# Patient Record
Sex: FEMALE | Race: WHITE | NOT HISPANIC OR LATINO | Employment: FULL TIME | URBAN - METROPOLITAN AREA
[De-identification: names, ages, dates, MRNs, and addresses within clinical notes are randomized per-mention and may not be internally consistent; named-entity substitution may affect disease eponyms.]

---

## 2023-08-31 ENCOUNTER — LAB (OUTPATIENT)
Dept: LAB | Facility: LAB | Age: 38
End: 2023-08-31
Payer: COMMERCIAL

## 2023-08-31 ENCOUNTER — OFFICE VISIT (OUTPATIENT)
Dept: PRIMARY CARE | Facility: CLINIC | Age: 38
End: 2023-08-31
Payer: COMMERCIAL

## 2023-08-31 VITALS
DIASTOLIC BLOOD PRESSURE: 70 MMHG | WEIGHT: 133 LBS | HEIGHT: 63 IN | HEART RATE: 58 BPM | BODY MASS INDEX: 23.57 KG/M2 | TEMPERATURE: 97.4 F | OXYGEN SATURATION: 99 % | SYSTOLIC BLOOD PRESSURE: 110 MMHG

## 2023-08-31 DIAGNOSIS — Z00.00 ANNUAL PHYSICAL EXAM: Primary | ICD-10-CM

## 2023-08-31 DIAGNOSIS — Z00.00 ANNUAL PHYSICAL EXAM: ICD-10-CM

## 2023-08-31 DIAGNOSIS — E03.9 HYPOTHYROIDISM, UNSPECIFIED TYPE: ICD-10-CM

## 2023-08-31 PROBLEM — L70.0 ACNE VULGARIS: Status: ACTIVE | Noted: 2022-05-23

## 2023-08-31 PROBLEM — L30.9 DERMATITIS, UNSPECIFIED: Status: ACTIVE | Noted: 2022-05-23

## 2023-08-31 PROBLEM — D22.60 MELANOCYTIC NEVI OF UNSPECIFIED UPPER LIMB, INCLUDING SHOULDER: Status: ACTIVE | Noted: 2022-05-23

## 2023-08-31 PROBLEM — D22.4 MELANOCYTIC NEVI OF SCALP AND NECK: Status: ACTIVE | Noted: 2022-05-23

## 2023-08-31 PROBLEM — D22.5 MELANOCYTIC NEVI OF TRUNK: Status: ACTIVE | Noted: 2022-05-23

## 2023-08-31 PROBLEM — L57.9 SKIN CHANGES DUE TO CHRONIC EXPOSURE TO NONIONIZING RADIATION, UNSPECIFIED: Status: ACTIVE | Noted: 2022-05-23

## 2023-08-31 PROBLEM — D22.70 MELANOCYTIC NEVI OF UNSPECIFIED LOWER LIMB, INCLUDING HIP: Status: ACTIVE | Noted: 2022-05-23

## 2023-08-31 PROBLEM — J01.90 ACUTE SINUSITIS: Status: ACTIVE | Noted: 2023-08-31

## 2023-08-31 PROBLEM — D22.39 MELANOCYTIC NEVI OF OTHER PARTS OF FACE: Status: ACTIVE | Noted: 2022-05-23

## 2023-08-31 PROBLEM — L30.1 DYSHIDROTIC ECZEMA: Status: ACTIVE | Noted: 2023-08-31

## 2023-08-31 PROBLEM — D48.5 NEOPLASM OF UNCERTAIN BEHAVIOR OF SKIN: Status: ACTIVE | Noted: 2022-05-23

## 2023-08-31 PROBLEM — F41.9 ANXIETY: Status: ACTIVE | Noted: 2023-08-31

## 2023-08-31 LAB
ALANINE AMINOTRANSFERASE (SGPT) (U/L) IN SER/PLAS: 17 U/L (ref 7–45)
ALBUMIN (G/DL) IN SER/PLAS: 4.4 G/DL (ref 3.4–5)
ALKALINE PHOSPHATASE (U/L) IN SER/PLAS: 45 U/L (ref 33–110)
ANION GAP IN SER/PLAS: 12 MMOL/L (ref 10–20)
ASPARTATE AMINOTRANSFERASE (SGOT) (U/L) IN SER/PLAS: 19 U/L (ref 9–39)
BILIRUBIN TOTAL (MG/DL) IN SER/PLAS: 1.5 MG/DL (ref 0–1.2)
CALCIUM (MG/DL) IN SER/PLAS: 10.1 MG/DL (ref 8.6–10.6)
CARBON DIOXIDE, TOTAL (MMOL/L) IN SER/PLAS: 27 MMOL/L (ref 21–32)
CHLORIDE (MMOL/L) IN SER/PLAS: 104 MMOL/L (ref 98–107)
CHOLESTEROL (MG/DL) IN SER/PLAS: 135 MG/DL (ref 0–199)
CHOLESTEROL IN HDL (MG/DL) IN SER/PLAS: 42.3 MG/DL
CHOLESTEROL/HDL RATIO: 3.2
CREATININE (MG/DL) IN SER/PLAS: 0.63 MG/DL (ref 0.5–1.05)
ERYTHROCYTE DISTRIBUTION WIDTH (RATIO) BY AUTOMATED COUNT: 11.9 % (ref 11.5–14.5)
ERYTHROCYTE MEAN CORPUSCULAR HEMOGLOBIN CONCENTRATION (G/DL) BY AUTOMATED: 33 G/DL (ref 32–36)
ERYTHROCYTE MEAN CORPUSCULAR VOLUME (FL) BY AUTOMATED COUNT: 87 FL (ref 80–100)
ERYTHROCYTES (10*6/UL) IN BLOOD BY AUTOMATED COUNT: 4.79 X10E12/L (ref 4–5.2)
GFR FEMALE: >90 ML/MIN/1.73M2
GLUCOSE (MG/DL) IN SER/PLAS: 68 MG/DL (ref 74–99)
HEMATOCRIT (%) IN BLOOD BY AUTOMATED COUNT: 41.8 % (ref 36–46)
HEMOGLOBIN (G/DL) IN BLOOD: 13.8 G/DL (ref 12–16)
LDL: 70 MG/DL (ref 0–99)
LEUKOCYTES (10*3/UL) IN BLOOD BY AUTOMATED COUNT: 5.4 X10E9/L (ref 4.4–11.3)
NRBC (PER 100 WBCS) BY AUTOMATED COUNT: 0 /100 WBC (ref 0–0)
PLATELETS (10*3/UL) IN BLOOD AUTOMATED COUNT: 203 X10E9/L (ref 150–450)
POTASSIUM (MMOL/L) IN SER/PLAS: 4.2 MMOL/L (ref 3.5–5.3)
PROTEIN TOTAL: 7.3 G/DL (ref 6.4–8.2)
SODIUM (MMOL/L) IN SER/PLAS: 139 MMOL/L (ref 136–145)
THYROTROPIN (MIU/L) IN SER/PLAS BY DETECTION LIMIT <= 0.05 MIU/L: 2.73 MIU/L (ref 0.44–3.98)
THYROXINE (T4) FREE (NG/DL) IN SER/PLAS: 1.33 NG/DL (ref 0.78–1.48)
TRIGLYCERIDE (MG/DL) IN SER/PLAS: 112 MG/DL (ref 0–149)
UREA NITROGEN (MG/DL) IN SER/PLAS: 12 MG/DL (ref 6–23)
VLDL: 22 MG/DL (ref 0–40)

## 2023-08-31 PROCEDURE — 85027 COMPLETE CBC AUTOMATED: CPT

## 2023-08-31 PROCEDURE — 1036F TOBACCO NON-USER: CPT | Performed by: FAMILY MEDICINE

## 2023-08-31 PROCEDURE — 80053 COMPREHEN METABOLIC PANEL: CPT

## 2023-08-31 PROCEDURE — 84443 ASSAY THYROID STIM HORMONE: CPT

## 2023-08-31 PROCEDURE — 99395 PREV VISIT EST AGE 18-39: CPT | Performed by: FAMILY MEDICINE

## 2023-08-31 PROCEDURE — 36415 COLL VENOUS BLD VENIPUNCTURE: CPT

## 2023-08-31 PROCEDURE — 84439 ASSAY OF FREE THYROXINE: CPT

## 2023-08-31 PROCEDURE — 80061 LIPID PANEL: CPT

## 2023-08-31 RX ORDER — LEVOTHYROXINE SODIUM 125 UG/1
CAPSULE ORAL
COMMUNITY
End: 2023-08-31 | Stop reason: ALTCHOICE

## 2023-08-31 RX ORDER — LEVOTHYROXINE SODIUM 112 UG/1
112 TABLET ORAL DAILY
COMMUNITY
End: 2023-09-07 | Stop reason: SDUPTHER

## 2023-08-31 RX ORDER — ACETAMINOPHEN AND CODEINE PHOSPHATE 300; 30 MG/1; MG/1
1 TABLET ORAL EVERY 4 HOURS PRN
COMMUNITY
Start: 2023-03-01 | End: 2023-08-31 | Stop reason: ALTCHOICE

## 2023-08-31 RX ORDER — CLINDAMYCIN PHOSPHATE 10 UG/ML
LOTION TOPICAL
COMMUNITY
Start: 2022-05-23 | End: 2024-03-14 | Stop reason: ALTCHOICE

## 2023-08-31 RX ORDER — IBUPROFEN 600 MG/1
TABLET ORAL EVERY 6 HOURS
COMMUNITY
Start: 2021-07-31 | End: 2024-03-14 | Stop reason: ALTCHOICE

## 2023-08-31 RX ORDER — TRIAMCINOLONE ACETONIDE 1 MG/G
OINTMENT TOPICAL
COMMUNITY
Start: 2022-02-02 | End: 2024-03-14 | Stop reason: ALTCHOICE

## 2023-08-31 RX ORDER — B-COMPLEX WITH VITAMIN C
1 TABLET ORAL DAILY
COMMUNITY
End: 2024-03-14 | Stop reason: ALTCHOICE

## 2023-08-31 SDOH — ECONOMIC STABILITY: HOUSING INSECURITY
IN THE LAST 12 MONTHS, WAS THERE A TIME WHEN YOU DID NOT HAVE A STEADY PLACE TO SLEEP OR SLEPT IN A SHELTER (INCLUDING NOW)?: NO

## 2023-08-31 SDOH — ECONOMIC STABILITY: FOOD INSECURITY: WITHIN THE PAST 12 MONTHS, YOU WORRIED THAT YOUR FOOD WOULD RUN OUT BEFORE YOU GOT MONEY TO BUY MORE.: NEVER TRUE

## 2023-08-31 SDOH — ECONOMIC STABILITY: INCOME INSECURITY: IN THE LAST 12 MONTHS, WAS THERE A TIME WHEN YOU WERE NOT ABLE TO PAY THE MORTGAGE OR RENT ON TIME?: NO

## 2023-08-31 SDOH — ECONOMIC STABILITY: FOOD INSECURITY: WITHIN THE PAST 12 MONTHS, THE FOOD YOU BOUGHT JUST DIDN'T LAST AND YOU DIDN'T HAVE MONEY TO GET MORE.: NEVER TRUE

## 2023-08-31 SDOH — ECONOMIC STABILITY: TRANSPORTATION INSECURITY
IN THE PAST 12 MONTHS, HAS LACK OF TRANSPORTATION KEPT YOU FROM MEETINGS, WORK, OR FROM GETTING THINGS NEEDED FOR DAILY LIVING?: NO

## 2023-08-31 SDOH — ECONOMIC STABILITY: TRANSPORTATION INSECURITY
IN THE PAST 12 MONTHS, HAS THE LACK OF TRANSPORTATION KEPT YOU FROM MEDICAL APPOINTMENTS OR FROM GETTING MEDICATIONS?: NO

## 2023-08-31 ASSESSMENT — PATIENT HEALTH QUESTIONNAIRE - PHQ9
SUM OF ALL RESPONSES TO PHQ9 QUESTIONS 1 & 2: 0
2. FEELING DOWN, DEPRESSED OR HOPELESS: NOT AT ALL
1. LITTLE INTEREST OR PLEASURE IN DOING THINGS: NOT AT ALL

## 2023-08-31 ASSESSMENT — SOCIAL DETERMINANTS OF HEALTH (SDOH)
IN THE PAST 12 MONTHS, HAS THE ELECTRIC, GAS, OIL, OR WATER COMPANY THREATENED TO SHUT OFF SERVICE IN YOUR HOME?: NO
WITHIN THE LAST YEAR, HAVE YOU BEEN AFRAID OF YOUR PARTNER OR EX-PARTNER?: NO
WITHIN THE LAST YEAR, HAVE YOU BEEN KICKED, HIT, SLAPPED, OR OTHERWISE PHYSICALLY HURT BY YOUR PARTNER OR EX-PARTNER?: NO
WITHIN THE LAST YEAR, HAVE YOU BEEN HUMILIATED OR EMOTIONALLY ABUSED IN OTHER WAYS BY YOUR PARTNER OR EX-PARTNER?: NO
HOW HARD IS IT FOR YOU TO PAY FOR THE VERY BASICS LIKE FOOD, HOUSING, MEDICAL CARE, AND HEATING?: NOT HARD AT ALL
WITHIN THE LAST YEAR, HAVE TO BEEN RAPED OR FORCED TO HAVE ANY KIND OF SEXUAL ACTIVITY BY YOUR PARTNER OR EX-PARTNER?: NO

## 2023-08-31 ASSESSMENT — ENCOUNTER SYMPTOMS
DEPRESSION: 0
LOSS OF SENSATION IN FEET: 0
OCCASIONAL FEELINGS OF UNSTEADINESS: 0

## 2023-08-31 ASSESSMENT — LIFESTYLE VARIABLES
HOW OFTEN DO YOU HAVE SIX OR MORE DRINKS ON ONE OCCASION: NEVER
AUDIT-C TOTAL SCORE: 0
SKIP TO QUESTIONS 9-10: 1
HOW MANY STANDARD DRINKS CONTAINING ALCOHOL DO YOU HAVE ON A TYPICAL DAY: PATIENT DOES NOT DRINK
HOW OFTEN DO YOU HAVE A DRINK CONTAINING ALCOHOL: NEVER

## 2023-08-31 ASSESSMENT — PAIN SCALES - GENERAL: PAINLEVEL: 0-NO PAIN

## 2023-08-31 NOTE — PROGRESS NOTES
"Subjective   Patient ID: Letty Dial is a 38 y.o. female who presents for Annual Exam (Annual cpe fasting bw).    HPI   Patient doing well overall  Just getting over COVID.  Began 10 days ago.    Hand joint swell.  Periodically patient feels her joints swell up a bit.  They feel stiff and sore and then resolved.  Review of Systems    Objective   /70 (BP Location: Left arm)   Pulse 58   Temp 36.3 °C (97.4 °F) (Temporal)   Ht 1.6 m (5' 3\")   Wt 60.3 kg (133 lb)   SpO2 99%   BMI 23.56 kg/m²     Physical Exam  No specialty comments available.  Alert, pleasant and in no acute distress.  Heart: Regular rate and rhythm without murmur  Lungs: Clear to auscultation  Lower extremities: No edema  Hands: No abnormalities noted.  No nodules or joint swelling appreciated.  Abdomen: Soft, nontender without palpable mass  Assessment/Plan   Problem List Items Addressed This Visit          Endocrine/Metabolic    Hypothyroidism    Relevant Orders    TSH    T4, free     Other Visit Diagnoses       Annual physical exam    -  Primary    Relevant Orders    Comprehensive Metabolic Panel    CBC    Lipid Panel    TSH    T4, free        Healthy 38-year-old here for physical.  She is overall doing well.  She is recovering from her lupus and COVID.  We will check her thyroid to follow-up on her history of hypothyroidism.  We will also get routine labs.  Discussed the importance of healthy lifestyle to minimize joint inflammation to help with her hands.  She can follow-up if there is any progression or worsening.  With family history of colon cancer, we will plan a first colonoscopy at age 42       "

## 2023-09-06 ENCOUNTER — TELEPHONE (OUTPATIENT)
Dept: PRIMARY CARE | Facility: CLINIC | Age: 38
End: 2023-09-06
Payer: COMMERCIAL

## 2023-09-07 DIAGNOSIS — E03.9 HYPOTHYROIDISM, UNSPECIFIED TYPE: Primary | ICD-10-CM

## 2023-09-07 RX ORDER — LEVOTHYROXINE SODIUM 112 UG/1
112 TABLET ORAL DAILY
Qty: 90 TABLET | Refills: 3 | Status: SHIPPED | OUTPATIENT
Start: 2023-09-07 | End: 2024-09-06

## 2024-02-12 ENCOUNTER — TELEPHONE (OUTPATIENT)
Dept: OBSTETRICS AND GYNECOLOGY | Facility: CLINIC | Age: 39
End: 2024-02-12
Payer: COMMERCIAL

## 2024-02-12 NOTE — TELEPHONE ENCOUNTER
Pt states that she and her  have been trying to conceive w/o luck x6 months. She is interested in trying clomid. Pt was last seen 9/2021 and is due for a LORY and Pap. She was advised to schedule an appt. To see Dr. Amaro. Transferred to  to schedule.

## 2024-03-14 ENCOUNTER — OFFICE VISIT (OUTPATIENT)
Dept: OBSTETRICS AND GYNECOLOGY | Facility: CLINIC | Age: 39
End: 2024-03-14
Payer: COMMERCIAL

## 2024-03-14 ENCOUNTER — LAB (OUTPATIENT)
Dept: LAB | Facility: LAB | Age: 39
End: 2024-03-14
Payer: COMMERCIAL

## 2024-03-14 VITALS
DIASTOLIC BLOOD PRESSURE: 76 MMHG | BODY MASS INDEX: 21.79 KG/M2 | HEIGHT: 63 IN | SYSTOLIC BLOOD PRESSURE: 118 MMHG | WEIGHT: 123 LBS

## 2024-03-14 DIAGNOSIS — Z01.419 ENCOUNTER FOR GYNECOLOGICAL EXAMINATION (GENERAL) (ROUTINE) WITHOUT ABNORMAL FINDINGS: ICD-10-CM

## 2024-03-14 DIAGNOSIS — Z12.4 CERVICAL CANCER SCREENING: ICD-10-CM

## 2024-03-14 DIAGNOSIS — N97.0 INFERTILITY ASSOCIATED WITH ANOVULATION: ICD-10-CM

## 2024-03-14 DIAGNOSIS — N92.0 POLYMENORRHEA: Primary | ICD-10-CM

## 2024-03-14 DIAGNOSIS — N92.0 POLYMENORRHEA: ICD-10-CM

## 2024-03-14 LAB
ESTRADIOL SERPL-MCNC: 141 PG/ML
FSH SERPL-ACNC: 5 IU/L

## 2024-03-14 PROCEDURE — 83001 ASSAY OF GONADOTROPIN (FSH): CPT

## 2024-03-14 PROCEDURE — 88175 CYTOPATH C/V AUTO FLUID REDO: CPT

## 2024-03-14 PROCEDURE — 1036F TOBACCO NON-USER: CPT | Performed by: OBSTETRICS & GYNECOLOGY

## 2024-03-14 PROCEDURE — 99395 PREV VISIT EST AGE 18-39: CPT | Performed by: OBSTETRICS & GYNECOLOGY

## 2024-03-14 PROCEDURE — 82670 ASSAY OF TOTAL ESTRADIOL: CPT

## 2024-03-14 PROCEDURE — 36415 COLL VENOUS BLD VENIPUNCTURE: CPT

## 2024-03-14 PROCEDURE — 87624 HPV HI-RISK TYP POOLED RSLT: CPT

## 2024-03-14 ASSESSMENT — ENCOUNTER SYMPTOMS
MUSCULOSKELETAL NEGATIVE: 1
HEMATOLOGIC/LYMPHATIC NEGATIVE: 1
ENDOCRINE NEGATIVE: 1
RESPIRATORY NEGATIVE: 1
CONSTITUTIONAL NEGATIVE: 1
ALLERGIC/IMMUNOLOGIC NEGATIVE: 1
NEUROLOGICAL NEGATIVE: 1
PSYCHIATRIC NEGATIVE: 1
EYES NEGATIVE: 1
CARDIOVASCULAR NEGATIVE: 1
GASTROINTESTINAL NEGATIVE: 1

## 2024-03-14 NOTE — PROGRESS NOTES
Subjective   Patient ID: Letty Dial is a 39 y.o. female who presents for Annual Exam (LORY//LAST PAP: /LAST MAMM:  n/a//Chaperone Declined, Effie Seals, MAII/).  Trying for 6-8 months.  Periods are coming every 3-3.5 weeks. Trying for 6 months.  Health is good. Mood is good.      Review of Systems   Constitutional: Negative.    HENT: Negative.     Eyes: Negative.    Respiratory: Negative.     Cardiovascular: Negative.    Gastrointestinal: Negative.    Endocrine: Negative.    Genitourinary: Negative.    Musculoskeletal: Negative.    Skin: Negative.    Allergic/Immunologic: Negative.    Neurological: Negative.    Hematological: Negative.    Psychiatric/Behavioral: Negative.         Objective   Physical Exam  Vitals reviewed.   Constitutional:       Appearance: Normal appearance. She is normal weight.   HENT:      Head: Normocephalic.   Neck:      Thyroid: No thyroid mass or thyromegaly.   Pulmonary:      Effort: Pulmonary effort is normal.   Chest:   Breasts:     Right: Normal.      Left: Normal.   Abdominal:      Palpations: Abdomen is soft.   Genitourinary:     General: Normal vulva.      Exam position: Lithotomy position.      Vagina: Normal.      Cervix: Normal.      Uterus: Normal.       Adnexa: Right adnexa normal and left adnexa normal.   Musculoskeletal:         General: Normal range of motion.      Cervical back: Normal range of motion and neck supple.   Lymphadenopathy:      Upper Body:      Right upper body: No supraclavicular or axillary adenopathy.      Left upper body: No supraclavicular or axillary adenopathy.   Skin:     General: Skin is warm and dry.   Neurological:      General: No focal deficit present.      Mental Status: She is alert.   Psychiatric:         Mood and Affect: Mood normal.         Behavior: Behavior normal.         Thought Content: Thought content normal.         Judgment: Judgment normal.       Assessment/Plan   Problem List Items Addressed This Visit             ICD-10-CM        Genitourinary and Reproductive    Encounter for gynecological examination (general) (routine) without abnormal findings Z01.419     Thank you for your visit for well woman care.  At your visit, you had a pap smear performed. The results will be available in [facility] in two to three weeks.  I will place a referral for the fertility doctors.           Other Visit Diagnoses         Codes    Polymenorrhea    -  Primary N92.0    Relevant Orders    Follicle Stimulating Hormone    Estradiol                 Tabitha Amaro MD 03/14/24 10:07 AM

## 2024-03-14 NOTE — ASSESSMENT & PLAN NOTE
Thank you for your visit for well woman care.  At your visit, you had a pap smear performed. The results will be available in [facility] in two to three weeks.  I will place a referral for the fertility doctors.

## 2024-03-25 ENCOUNTER — TELEPHONE (OUTPATIENT)
Dept: OBSTETRICS AND GYNECOLOGY | Facility: CLINIC | Age: 39
End: 2024-03-25
Payer: COMMERCIAL

## 2024-03-25 NOTE — TELEPHONE ENCOUNTER
Attempted to call pt back to further discuss her message.   Got her voice mail.     Message left to call office.

## 2024-04-29 ENCOUNTER — OFFICE VISIT (OUTPATIENT)
Dept: PRIMARY CARE | Facility: CLINIC | Age: 39
End: 2024-04-29
Payer: COMMERCIAL

## 2024-04-29 VITALS
HEART RATE: 64 BPM | OXYGEN SATURATION: 99 % | WEIGHT: 124.6 LBS | BODY MASS INDEX: 22.07 KG/M2 | TEMPERATURE: 97.5 F | SYSTOLIC BLOOD PRESSURE: 120 MMHG | DIASTOLIC BLOOD PRESSURE: 70 MMHG

## 2024-04-29 DIAGNOSIS — S00.83XD CONTUSION OF FACE, SUBSEQUENT ENCOUNTER: Primary | ICD-10-CM

## 2024-04-29 DIAGNOSIS — S05.11XD CONTUSION OF RIGHT EYE, SUBSEQUENT ENCOUNTER: ICD-10-CM

## 2024-04-29 PROCEDURE — 99213 OFFICE O/P EST LOW 20 MIN: CPT | Performed by: FAMILY MEDICINE

## 2024-04-29 PROCEDURE — 1036F TOBACCO NON-USER: CPT | Performed by: FAMILY MEDICINE

## 2024-04-29 ASSESSMENT — ENCOUNTER SYMPTOMS: DEPRESSION: 0

## 2024-04-29 ASSESSMENT — PAIN SCALES - GENERAL: PAINLEVEL: 4

## 2024-04-29 NOTE — PROGRESS NOTES
Subjective   Patient ID: Letty Dial is a 39 y.o. female who presents for Follow-up (ED follow up from fall. Swelling on right eye. ).    HPI   Patient here for follow-up from the emergency room.  Wednesday night, missed a step and fell into a table.  She hit the corner from the right side of her head/face on the table.  She had an open wound that was glued.  She does not believe she had any loss of consciousness.  She has had no visual problems or nausea/vomiting.  She has been quite swollen in the area and felt the swelling got worse yesterday.  It is slightly better today.    Review of Systems    Objective   /70 (BP Location: Left arm, Patient Position: Sitting, BP Cuff Size: Adult)   Pulse 64   Temp 36.4 °C (97.5 °F) (Temporal)   Wt 56.5 kg (124 lb 9.6 oz)   LMP 04/18/2024   SpO2 99%   BMI 22.07 kg/m²     Physical Exam  Alert, pleasant and in no acute distress.  Face: There is swelling and bruising noted on the right side of the face around the eye and over the upper cheek.  There is a closed 1 cm wound at the lateral brow line.  Eyes: PERRLA, EOMI.  Fundi clear.  There is a small area of subconjunctival hemorrhage below the right iris.  Heart: Regular rate and rhythm without murmur  Lungs: Clear to auscultation    Assessment/Plan   Problem List Items Addressed This Visit    None  Visit Diagnoses         Codes    Contusion of face, subsequent encounter    -  Primary S00.83XD    Contusion of right eye, subsequent encounter     S05.11XD        Patient here for follow-up following a contusion to the right side of her face.  She is quite swollen.  Things appear to be healing.  Reassurance provided.  She is to monitor for any progression or worsening of symptoms including visual changes, nausea, vomiting.

## 2024-05-02 ENCOUNTER — TELEPHONE (OUTPATIENT)
Dept: PRIMARY CARE | Facility: CLINIC | Age: 39
End: 2024-05-02
Payer: COMMERCIAL

## 2024-05-02 NOTE — TELEPHONE ENCOUNTER
Pt left VM stating that she was in the office on 04/29 from fall and swelling on her right eye. Pt states her eye has gotten worst in the last two days. Pt is wondering if she is having an allergic reaction to the glue the ER used on her. Pt would like a call back if possible.    Patent

## 2024-05-16 ENCOUNTER — LAB (OUTPATIENT)
Dept: LAB | Facility: LAB | Age: 39
End: 2024-05-16
Payer: COMMERCIAL

## 2024-05-16 ENCOUNTER — OFFICE VISIT (OUTPATIENT)
Dept: PRIMARY CARE | Facility: CLINIC | Age: 39
End: 2024-05-16
Payer: COMMERCIAL

## 2024-05-16 VITALS
BODY MASS INDEX: 22.15 KG/M2 | SYSTOLIC BLOOD PRESSURE: 118 MMHG | HEIGHT: 63 IN | HEART RATE: 66 BPM | OXYGEN SATURATION: 98 % | DIASTOLIC BLOOD PRESSURE: 70 MMHG | WEIGHT: 125 LBS

## 2024-05-16 DIAGNOSIS — G44.309 POST-CONCUSSION HEADACHE: ICD-10-CM

## 2024-05-16 DIAGNOSIS — E03.9 HYPOTHYROIDISM, UNSPECIFIED TYPE: ICD-10-CM

## 2024-05-16 DIAGNOSIS — Z00.00 ANNUAL PHYSICAL EXAM: ICD-10-CM

## 2024-05-16 DIAGNOSIS — Z00.00 ANNUAL PHYSICAL EXAM: Primary | ICD-10-CM

## 2024-05-16 LAB
ALBUMIN SERPL BCP-MCNC: 4.4 G/DL (ref 3.4–5)
ALP SERPL-CCNC: 48 U/L (ref 33–110)
ALT SERPL W P-5'-P-CCNC: 14 U/L (ref 7–45)
ANION GAP SERPL CALC-SCNC: 14 MMOL/L (ref 10–20)
AST SERPL W P-5'-P-CCNC: 15 U/L (ref 9–39)
BILIRUB SERPL-MCNC: 1 MG/DL (ref 0–1.2)
BUN SERPL-MCNC: 16 MG/DL (ref 6–23)
CALCIUM SERPL-MCNC: 10.1 MG/DL (ref 8.6–10.6)
CHLORIDE SERPL-SCNC: 103 MMOL/L (ref 98–107)
CHOLEST SERPL-MCNC: 148 MG/DL (ref 0–199)
CHOLESTEROL/HDL RATIO: 2.6
CO2 SERPL-SCNC: 28 MMOL/L (ref 21–32)
CREAT SERPL-MCNC: 0.56 MG/DL (ref 0.5–1.05)
EGFRCR SERPLBLD CKD-EPI 2021: >90 ML/MIN/1.73M*2
ERYTHROCYTE [DISTWIDTH] IN BLOOD BY AUTOMATED COUNT: 12.8 % (ref 11.5–14.5)
GLUCOSE SERPL-MCNC: 74 MG/DL (ref 74–99)
HCT VFR BLD AUTO: 42.4 % (ref 36–46)
HDLC SERPL-MCNC: 56.2 MG/DL
HGB BLD-MCNC: 13.5 G/DL (ref 12–16)
LDLC SERPL CALC-MCNC: 82 MG/DL
MCH RBC QN AUTO: 28.6 PG (ref 26–34)
MCHC RBC AUTO-ENTMCNC: 31.8 G/DL (ref 32–36)
MCV RBC AUTO: 90 FL (ref 80–100)
NON HDL CHOLESTEROL: 92 MG/DL (ref 0–149)
NRBC BLD-RTO: 0 /100 WBCS (ref 0–0)
PLATELET # BLD AUTO: 225 X10*3/UL (ref 150–450)
POTASSIUM SERPL-SCNC: 4.9 MMOL/L (ref 3.5–5.3)
PROT SERPL-MCNC: 7.2 G/DL (ref 6.4–8.2)
RBC # BLD AUTO: 4.72 X10*6/UL (ref 4–5.2)
SODIUM SERPL-SCNC: 140 MMOL/L (ref 136–145)
TRIGL SERPL-MCNC: 50 MG/DL (ref 0–149)
TSH SERPL-ACNC: 0.67 MIU/L (ref 0.44–3.98)
VLDL: 10 MG/DL (ref 0–40)
WBC # BLD AUTO: 3.8 X10*3/UL (ref 4.4–11.3)

## 2024-05-16 PROCEDURE — 80061 LIPID PANEL: CPT

## 2024-05-16 PROCEDURE — 99395 PREV VISIT EST AGE 18-39: CPT | Performed by: FAMILY MEDICINE

## 2024-05-16 PROCEDURE — 80053 COMPREHEN METABOLIC PANEL: CPT

## 2024-05-16 PROCEDURE — 36415 COLL VENOUS BLD VENIPUNCTURE: CPT

## 2024-05-16 PROCEDURE — 1036F TOBACCO NON-USER: CPT | Performed by: FAMILY MEDICINE

## 2024-05-16 PROCEDURE — 99213 OFFICE O/P EST LOW 20 MIN: CPT | Performed by: FAMILY MEDICINE

## 2024-05-16 PROCEDURE — 84443 ASSAY THYROID STIM HORMONE: CPT

## 2024-05-16 PROCEDURE — 85027 COMPLETE CBC AUTOMATED: CPT

## 2024-05-16 ASSESSMENT — PAIN SCALES - GENERAL: PAINLEVEL: 0-NO PAIN

## 2024-05-16 ASSESSMENT — ENCOUNTER SYMPTOMS: DEPRESSION: 0

## 2024-05-16 NOTE — PROGRESS NOTES
"Subjective   Patient ID: Letty Dial is a 39 y.o. female who presents for Annual Exam (Annual physical exam, patient is fasting. ).    HPI     .  2 children, 4 and 2.  Walking and running along with pilates  Complains of headaches most days.  This has been since her head injury a few weeks ago.  No neurologic or visual changes.  She has progressed from walking to doing her more vigorous exercises this past week and has had no worsening of her headaches.  Also states she gets occasional warmth feelings about once a month.  She says her temp seems to go up just a bit.  Around 100.  No other illness type symptoms.  Review of Systems  Cardiovascular: no chest pain, no edema, no palpitations, and no syncope.   Respiratory: no cough,no shortness of breath, and no wheezing  Gastrointestinal: no abdominal pain, nausea, vomiting or blood in stools  Genitourinary: no dysuria or hematuria    Objective   /70 (BP Location: Left arm, Patient Position: Sitting, BP Cuff Size: Adult)   Pulse 66   Ht 1.6 m (5' 3\")   Wt 56.7 kg (125 lb)   LMP 05/11/2024   SpO2 98%   BMI 22.14 kg/m²     Physical Exam  Alert, pleasant and in no acute distress.  Neck: Supple, no JVD or carotid bruit  Heart: Regular rate and rhythm, no murmur  Lungs: Clear to auscultation  Lower extremities: No edema  Skin: clear    Assessment/Plan   Problem List Items Addressed This Visit             ICD-10-CM    Hypothyroidism E03.9    Relevant Orders    TSH with reflex to Free T4 if abnormal    Comprehensive Metabolic Panel    CBC    Lipid Panel     Other Visit Diagnoses         Codes    Annual physical exam    -  Primary Z00.00    Relevant Orders    TSH with reflex to Free T4 if abnormal    Comprehensive Metabolic Panel    CBC    Lipid Panel    Post-concussion headache     G44.309          Patient here for annual checkup.  She is doing well.  She is keeping active and eating healthy.  She is still having some residual headaches since her head " injury a few weeks ago.  A couple of ibuprofen seem to bring good relief.  Advised her to keep an eye on things.  Hopefully these will slowly fade over the month.  She has to follow-up if there is any progression, change or worsening.  Patient education regarding autoimmune diseases, arthritis has possible underlying cause of her once a month temperature raises.  She is going to keep an eye on things and follow-up if there is any progression.  Routine labs ordered.  Will call in 1 week with results

## 2024-08-26 ENCOUNTER — CONSULT (OUTPATIENT)
Dept: ENDOCRINOLOGY | Facility: CLINIC | Age: 39
End: 2024-08-26
Payer: COMMERCIAL

## 2024-08-26 ENCOUNTER — ANCILLARY PROCEDURE (OUTPATIENT)
Dept: ENDOCRINOLOGY | Facility: CLINIC | Age: 39
End: 2024-08-26
Payer: COMMERCIAL

## 2024-08-26 VITALS
BODY MASS INDEX: 22.57 KG/M2 | DIASTOLIC BLOOD PRESSURE: 77 MMHG | HEART RATE: 48 BPM | WEIGHT: 127.38 LBS | SYSTOLIC BLOOD PRESSURE: 123 MMHG | HEIGHT: 63 IN

## 2024-08-26 DIAGNOSIS — Z31.83 ENCOUNTER FOR ASSISTED REPRODUCTIVE FERTILITY CYCLE: ICD-10-CM

## 2024-08-26 DIAGNOSIS — Z11.3 SCREENING FOR STDS (SEXUALLY TRANSMITTED DISEASES): ICD-10-CM

## 2024-08-26 DIAGNOSIS — Z31.41 FERTILITY TESTING: ICD-10-CM

## 2024-08-26 DIAGNOSIS — N97.0 INFERTILITY ASSOCIATED WITH ANOVULATION: ICD-10-CM

## 2024-08-26 DIAGNOSIS — N97.9 FEMALE INFERTILITY: ICD-10-CM

## 2024-08-26 DIAGNOSIS — Z13.29 SCREENING FOR THYROID DISORDER: ICD-10-CM

## 2024-08-26 DIAGNOSIS — Z01.812 ENCOUNTER FOR PREPROCEDURAL LABORATORY EXAMINATION: ICD-10-CM

## 2024-08-26 DIAGNOSIS — N97.9 SECONDARY FEMALE INFERTILITY: Primary | ICD-10-CM

## 2024-08-26 DIAGNOSIS — Z11.59 ENCOUNTER FOR SCREENING FOR OTHER VIRAL DISEASES: ICD-10-CM

## 2024-08-26 DIAGNOSIS — Z01.83 ENCOUNTER FOR RH BLOOD TYPING: ICD-10-CM

## 2024-08-26 LAB
ABO GROUP (TYPE) IN BLOOD: NORMAL
ANTIBODY SCREEN: NORMAL
HBV SURFACE AG SERPL QL IA: NONREACTIVE
HCV AB SER QL: NONREACTIVE
HIV 1+2 AB+HIV1 P24 AG SERPL QL IA: NONREACTIVE
RH FACTOR (ANTIGEN D): NORMAL
TREPONEMA PALLIDUM IGG+IGM AB [PRESENCE] IN SERUM OR PLASMA BY IMMUNOASSAY: NONREACTIVE
TSH SERPL-ACNC: 2.2 MIU/L (ref 0.44–3.98)
VARICELLA ZOSTER IGG INDEX: 5.6 IA
VZV IGG SER QL IA: POSITIVE

## 2024-08-26 PROCEDURE — 86787 VARICELLA-ZOSTER ANTIBODY: CPT

## 2024-08-26 PROCEDURE — 86900 BLOOD TYPING SEROLOGIC ABO: CPT

## 2024-08-26 PROCEDURE — 86803 HEPATITIS C AB TEST: CPT

## 2024-08-26 PROCEDURE — 84443 ASSAY THYROID STIM HORMONE: CPT

## 2024-08-26 PROCEDURE — 86780 TREPONEMA PALLIDUM: CPT

## 2024-08-26 PROCEDURE — 87491 CHLMYD TRACH DNA AMP PROBE: CPT

## 2024-08-26 PROCEDURE — 99214 OFFICE O/P EST MOD 30 MIN: CPT

## 2024-08-26 PROCEDURE — 86901 BLOOD TYPING SEROLOGIC RH(D): CPT

## 2024-08-26 PROCEDURE — 86850 RBC ANTIBODY SCREEN: CPT

## 2024-08-26 PROCEDURE — 87389 HIV-1 AG W/HIV-1&-2 AB AG IA: CPT

## 2024-08-26 PROCEDURE — 87591 N.GONORRHOEAE DNA AMP PROB: CPT

## 2024-08-26 PROCEDURE — 87340 HEPATITIS B SURFACE AG IA: CPT

## 2024-08-26 RX ORDER — PROPYLENE GLYCOL/PEG 400 0.3 %-0.4%
DROPS OPHTHALMIC (EYE)
Qty: 30 EACH | Refills: 3 | Status: SHIPPED | OUTPATIENT
Start: 2024-08-26 | End: 2024-11-24

## 2024-08-26 RX ORDER — PROGESTERONE 50 MG/ML
75 INJECTION, SOLUTION INTRAMUSCULAR DAILY
Qty: 50 ML | Refills: 3 | Status: SHIPPED | OUTPATIENT
Start: 2024-08-26 | End: 2025-08-26

## 2024-08-26 RX ORDER — ESTRADIOL 2 MG/1
6 TABLET ORAL DAILY
Qty: 90 TABLET | Refills: 2 | Status: SHIPPED | OUTPATIENT
Start: 2024-08-26 | End: 2025-08-26

## 2024-08-26 RX ORDER — LIDOCAINE AND PRILOCAINE 25; 25 MG/G; MG/G
CREAM TOPICAL DAILY
Qty: 30 G | Refills: 2 | Status: SHIPPED | OUTPATIENT
Start: 2024-08-26 | End: 2025-08-26

## 2024-08-26 RX ORDER — ESTRADIOL 2 MG/1
2 TABLET ORAL 2 TIMES DAILY
Start: 2024-08-26 | End: 2025-08-26

## 2024-08-26 ASSESSMENT — COLUMBIA-SUICIDE SEVERITY RATING SCALE - C-SSRS
2. HAVE YOU ACTUALLY HAD ANY THOUGHTS OF KILLING YOURSELF?: NO
6. HAVE YOU EVER DONE ANYTHING, STARTED TO DO ANYTHING, OR PREPARED TO DO ANYTHING TO END YOUR LIFE?: NO
1. IN THE PAST MONTH, HAVE YOU WISHED YOU WERE DEAD OR WISHED YOU COULD GO TO SLEEP AND NOT WAKE UP?: NO

## 2024-08-26 ASSESSMENT — PATIENT HEALTH QUESTIONNAIRE - PHQ9
1. LITTLE INTEREST OR PLEASURE IN DOING THINGS: NOT AT ALL
2. FEELING DOWN, DEPRESSED OR HOPELESS: NOT AT ALL
SUM OF ALL RESPONSES TO PHQ9 QUESTIONS 1 AND 2: 0

## 2024-08-26 ASSESSMENT — PAIN SCALES - GENERAL: PAINLEVEL: 0-NO PAIN

## 2024-08-26 NOTE — PROGRESS NOTES
[place] Interval FET Checklist    Age: 39 y.o.    2021 FT  male 6.12 no complications with pregnancy/delivery (fast, baby in NICU x 1 week)- natural conception  10- FT  IOL and pushed 4 hrs female 8.10 no complications with pregnancy/delivery - FET  Provider: Juliat Pinto CNP  Primary RN: Gertrudis Polanco  Reasons for Treatment: secondary infertility x 1 year, s/p IVF in  with successful FET 2019 and then with spontaneous conception  and delivered , would like to discuss FET, has 3 remaining embryos   Last BMI  24 : 22.38 kg/m²       Past Medical History:   Diagnosis Date    Acute upper respiratory infection, unspecified 2021    URI, acute    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    GBS carrier    Contact with and (suspected) exposure to covid-19 2021    Suspected COVID-19 virus infection    Diseases of the digestive system complicating pregnancy, unspecified trimester ([facility]) 2019    Constipation in pregnancy    Encounter for gynecological examination (general) (routine) without abnormal findings 2020    Encounter for well woman exam with routine gynecological exam    Encounter for routine postpartum follow-up ([facility]) 2019    Postpartum care following  delivery    Encounter for routine postpartum follow-up ([facility]) 2021    Routine postpartum follow-up    Hashimoto's thyroiditis     Laceration without foreign body of unspecified finger without damage to nail, initial encounter 2020    Finger laceration    Maternal care for unspecified type scar from previous  delivery ([facility]) 2021    Uterine scar from previous  delivery affecting pregnancy    Other conditions influencing health status 10/15/2019    Large for gestational age fetus    Other hypertrophic disorders of the skin 2021    Skin tag    Personal history of other complications of pregnancy, childbirth and the  puerperium 08/12/2021    History of lactation disorder    Personal history of other diseases of the female genital tract 12/11/2018    History of female infertility    Personal history of other endocrine, nutritional and metabolic disease     History of Hashimoto thyroiditis    Supervision of pregnancy resulting from assisted reproductive technology, unspecified trimester ([facility]) 10/15/2019    Pregnancy resulting from in vitro fertilization       Date Done Consultation Results/Comments   8/26/24 Medication Protocol FOLLOW UP PLAN:  Programmed FET Protocol: Estrace 6 mg/vaginal Estrace 2 mg BID PV/LORRI 75 mg     Julita Pinto CNP 08/26/24 9:10 AM    9/3/24 FET Treatment Plan Packet- ID REQ (Every cycle) Received and in chart: Yes (Gertrudis Polanco RN)   9/3/24 IVF Information and Authorization - [employer]/offsite Storage (ID REQ) (Yearly) Received and in chart: Yes (Gertrudis Polanco RN)   9/3/24 [employer] Embryo- Couple or Single Packet (Yearly) Received and in chart: Yes (Gertrudis Polanco RN)   9/3/24  Waiver (In) Form Received and in chart: Yes (Gertrudis Polanco RN)   9/3/24 Embryo Ship In 1 Embryo ship in;    ID#   Embryo #1 [facility]      8/26/24 Procedure Order Placed [x]    N/A MFM Consult Okay to proceed? N/A   N/A Psych Consult Okay to proceed? N/A   N/A Genetics Consult Okay to proceed? N/A    Other    Date Done Female Labs Results/Comments   8/26/2024 T&S (Q 1 Year) ABO: AB  Rh: POS  Antibody: NEG   8/26/2024 Hep B sAg Nonreactive   8/26/2024 Hep C AB Nonreactive   8/26/2024 HIV Nonreactive   8/26/2024 Syphilis Nonreactive   8/26/2024 GC/CT GC: Negative  CT: Negative   1/6/2021 Rubella (Q 5 Years) POSITIVE   8/26/2024 Varicella (Q 5 Years) Positive (A)   8/26/2024 TSH 2.20 (Ref range: 0.44 - 3.98 mIU/L)    HgbA1C     Uterine Cavity Eval Pelvic 8/27/24: Impression  =========     Multiple dominant follicles noted. Otherwise , Unremarkable pelvic survey.  Follow-up  ========     Follow-up with  ordering Provider. Follow up for repeat assessment per clinical team  Pelvic update 9/19/24: Per Dr. Francisco/BEBA Pinto CNP, no repeat pelvic ultrasound is required  BEBA Pinto CNP/QUIRINO Rand RN 9/19/24 @ [id number]    HSG:   SIS:   Hyster 9/19/2024: The hysteroscope was placed in the cervix and advanced into the uterine cavity. Normal saline was used for distension media. Images were obtained and findings noted as below.   All instruments were then removed. Good hemostasis was noted. Patient tolerated the procedure well returned to the recovery area in stable condition. .   Findings:   Cavity: Smooth cavity no lesions noted  Ostia: not visualized on exam  Additional Notes:    I reviewed the resident/fellow's documentation and discussed the patient with the resident/fellow. I agree with the resident/fellow's medical decision making as documented in the note.      Juanita Gordon MD         3/14/2024 Pap Smear Lab Results   Component Value Date    FINALINTERP  03/14/2024         A. THINPREP PAP CERVIX, SCREENING -     Specimen Adequacy  Satisfactory for evaluation; absence of endocervical/transformation zone component    General Categorization  Negative for intraepithelial lesion or malignancy.    Descriptive Interpretation  Negative for intraepithelial lesion or malignancy            CVTFINALDX  05/23/2022     SKIN, MID BACK, SHAVE BIOPSY:  MILDLY DYSPLASTIC COMPOUND NEVUS, INKED MARGINS FREE IN PLANES OF SECTIONS  EXAMINED.                  Electronically Signed Out by JOHN FARRELL M.D.         HPV: Negative   N/A Mammogram ( > 40) N/A   Date Done Miscellaneous Results/Comments   N/A BMI Checklist  BMI > 40 or < 18 Added to chart:   No   N/A >= 45 Checklist  Added to chart:   No   **Does not need to be completed prior to placing on IVF calendar**    MD Completion:  Ectopic Risk: No  Medically Complex: No    Sophia Quintanilla 09/24/24 4:12 PM

## 2024-08-26 NOTE — PROGRESS NOTES
Visit Type: In Person    NEW FERTILITY PATIENT VISIT    Referred by:  re-establish care from     Accompanied today by:  self     Letty Dial is a 39 y.o.  female who presents with  secondary infertility x 1 year, s/p IVF in  with successful FET 2019 and then with spontaneous conception  and delivered , would like to discuss FET, has 3 remaining blast embryos     Genetic screening performed previously:  Patient Progenity screening on : negative CF, SMA, Fragile x. Partner has CF in his family, he is a carrier, children are CF carriers.    Have you had any concerns about your fertility treatments so far?     What are you goals for today's visit?     What causes of infertility have been identified on your workup so far?     Past Infertility Treatments:      Please summarize your fertility treatments to date.       As far as you are aware, do you have insurance coverage for fertility diagnostic testing and/or fertility treatments?      PRIOR EVALUATION / TREATMENT:  IVF 10-: 18 eggs: Follistim 1560 units/Menopur 75 units/Ganirelix/HCG  Fresh ET 10- 1 blast- unsuccessful  FET 2018 1 blast: Estrace 6 mg/vaginal Estrace 2 mg BID PV/LORRI 50 mg (dose was increased day of FET)  FET 2019 1 blast successful: Estrace 6 mg/vaginal Estrace 2 mg BID PV/LORRI 75 mg (dose was increased day of FET)  Clomid/IUI x 3 with OPKs @ [facility]      Hysterosalpingogram: HSG at [facility]  Saline Infused Sonography: normal 2018  GYN Pelvic Ultrasound: no recent        Prior Labs  Lab Results    Date Done      AMH: No results found for requested labs within last 1825 days. No results found for requested labs within last 1825 days.   TSH: 0.67 (Ref range: 0.44 - 3.98 mIU/L) 2024   PRL: No results found for requested labs within last 1825 days. No results found for requested labs within last 1825 days.   Testosterone: No results found for requested labs within last 1825 days. No results  found for requested labs within last 1825 days.   DHEAS: No results found for requested labs within last 1825 days. No results found for requested labs within last 1825 days.   FSH: 5.0 (Ref range: IU/L) 3/14/2024   17 OHP: No results found for requested labs within last 1825 days. No results found for requested labs within last 1825 days.   HgbA1c: No results found for requested labs within last 1825 days. No results found for requested labs within last 1825 days.   Hepatitis B surface antigen: NONREACTIVE (Ref range: NONREACTIVE) 2021   Hepatitis C antibody: NONREACTIVE (Ref range: NONREACTIVE) 2021   HIV ½ Antigen Antibody screen with reflex: NONREACTIVE (Ref range: NONREACTIVE) 2021   Syphilis screening with reflex: No results found for requested labs within last 1825 days. 2021   GC: NEGATIVE (Ref range: Negative) 2021   CT: NEGATIVE (Ref range: Negative) 2021   Type and Screen: AB 2021   Rh: POS 2021   Antibody: No results found for requested labs within last 1825 days. No results found for requested labs within last 1825 days.   Rubella: POSITIVE (Ref range: ) 2021   Varicella: No results found for requested labs within last 1825 days. No results found for requested labs within last 1825 days.   Hemoglobin: No results found for requested labs within last 1825 days. No results found for requested labs within last 1825 days.   Hematocrit: No results found for requested labs within last 1825 days. No results found for requested labs within last 1825 days.   Creatinine: 0.56 (Ref range: 0.50 - 1.05 mg/dL) 2024   AST:15 (Ref range: 9 - 39 U/L) 2024   ALT:14 (Ref range: 7 - 45 U/L): 2024     Relationship Status:      Have you ever been pregnant?  yes  How many times have you been pregnant?  2  Have you ever had a miscarriage?  no  How many times have you had a miscarriage?  NA     OB Hx:   2021 FT  male 6.12 no complications with  pregnancy/delivery (fast, baby in NICU x 1 week)  10- FT  IOL and pushed 4 hrs female 8.10 no complications with pregnancy/delivery      OB History          2    Para   2    Term   2       0    AB   0    Living   2         SAB   0    IAB   0    Ectopic   0    Multiple   0    Live Births   2                 GYN HISTORY   Have you ever been diagnosed with a sexually transmitted disease?  no  Please select all that are applicable:    Have you ever had Pelvic Inflammatory Disease?  no  Have you had an abnormal PAP smear?  no  Date & Result of last PAP smear:   Lab Results   Component Value Date    FINALINTERP  2024         A. THINPREP PAP CERVIX, SCREENING -     Specimen Adequacy  Satisfactory for evaluation; absence of endocervical/transformation zone component    General Categorization  Negative for intraepithelial lesion or malignancy.    Descriptive Interpretation  Negative for intraepithelial lesion or malignancy              Have you ever had an abnormal Mammogram?  NA  Date & result of your last mammogram:  no  Do you have pelvic pain?  no  How many times per week do you have intercourse?  4-5x/week  Do you have pain with intercourse?  no  Do you use lubricants with intercourse?    Do you have pain with bowel movements?     no        Do you have pain with a full bladder?  no  MENSTRUAL HISTORY  LMP:  2024  Menarche:  14  Contraception:  OCPs  Cycle length:  every 21-24 days  Describe your bleedin-6 days  Dysmenorrhea:  no     ENDOCRINE/INFERTILITY HISTORY  Duration of infertility:  1 yr secondary  Coital Activity/week:  4-5x/week  Nipple Discharge:  no  Vision changes:  no  Headaches:  no  Excess hair growth:  no  Excessive hair loss:  no  Acne:  no  Oily skin:  no  Recent weight change  Weight gain:  no  Weight loss:  no  Exercise more than 3 times a week:  yes    PMH: Hashimoto's  Past Medical History:   Diagnosis Date    Acute upper respiratory infection,  unspecified 2021    URI, acute    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    GBS carrier    Contact with and (suspected) exposure to covid-19 2021    Suspected COVID-19 virus infection    Diseases of the digestive system complicating pregnancy, unspecified trimester ([facility]) 2019    Constipation in pregnancy    Encounter for gynecological examination (general) (routine) without abnormal findings 2020    Encounter for well woman exam with routine gynecological exam    Encounter for routine postpartum follow-up ([facility]) 2019    Postpartum care following  delivery    Encounter for routine postpartum follow-up ([facility]) 2021    Routine postpartum follow-up    Hashimoto's thyroiditis     Laceration without foreign body of unspecified finger without damage to nail, initial encounter 2020    Finger laceration    Maternal care for unspecified type scar from previous  delivery ([facility]) 2021    Uterine scar from previous  delivery affecting pregnancy    Other conditions influencing health status 10/15/2019    Large for gestational age fetus    Other hypertrophic disorders of the skin 2021    Skin tag    Personal history of other complications of pregnancy, childbirth and the puerperium 2021    History of lactation disorder    Personal history of other diseases of the female genital tract 2018    History of female infertility    Personal history of other endocrine, nutritional and metabolic disease     History of Hashimoto thyroiditis    Supervision of pregnancy resulting from assisted reproductive technology, unspecified trimester ([facility]) 10/15/2019    Pregnancy resulting from in vitro fertilization        MEDICATIONS  Current Outpatient Medications on File Prior to Visit   Medication Sig Dispense Refill    levothyroxine (Synthroid, Levoxyl) 112 mcg tablet Take 1 tablet (112 mcg) by mouth once daily. 90 tablet 3     No current  facility-administered medications on file prior to visit.        PSH  Past Surgical History:   Procedure Laterality Date    OTHER SURGICAL HISTORY      Breast Surgery Enlargement Procedure     OTHER SURGICAL HISTORY  2021     section    RHINOPLASTY              PSYCH HISTORY: no           SOCIAL HISTORY  Social History     Tobacco Use    Smoking status: Never     Passive exposure: Never    Smokeless tobacco: Never   Vaping Use    Vaping status: Never Used   Substance Use Topics    Alcohol use: Yes     Comment: Social    Drug use: Never     Occupation:  finance  Have you ever been incarcerated?  no  Do you have a history of domestic violence?  no  Do you feel safe at home?  yes  Do you have a history of any negative sexual experience such as incest or rape?  no     PARTNER HISTORY  Partner Name:  Jese Dial  Partner :  [id number]  Partner email:    Occupation:    Prior fertility history:  no  PMH: heart disease and high cholesterol    PSH:  no  Smoking: no  Alcohol Use:  social  Drug Use:  no  Medications:  cholesterol repatha, second cholesterol   Injuries:  no  STD:  no  Please select all that are applicable:    SA:  normal [facility]   SA Results:      FAMILY HISTORY  Family History   Problem Relation Name Age of Onset    Other (uterine fibroids) Mother      Atrial fibrillation Mother      No Known Problems Father      Crohn's disease Brother      Heart attack Maternal Grandfather      Colon cancer Paternal Grandmother      Diabetes Paternal Grandfather         CANCER HISTORY    Breast:  no  Ovarian:  no  Colon:  PatGM- age diagnosed 50-55  Endometrial:  no    FAMILY VTE HISTORY  Family History of Blood Clots:  no    GENETIC HISTORY  Ethnic Background  Patient:  Romansh, English, Faroese  Partner:  Slovakian, Faroese, Macedonian  Genetic Disease in Family  Patient:  no- patient negative for CF carrier status  Partner:  CF is in his family, he is a carrier, children are CF  "carriers  Birth Defects in Family  Patient:  no  Partner:  no    Genetic screening performed previously:  Progenity : negative CF, SMA, Fragile x      BMI:   BMI Readings from Last 1 Encounters:   24 22.56 kg/m²     VITALS:  /77   Pulse (!) 48   Ht 1.6 m (5' 3\")   Wt 57.8 kg (127 lb 6 oz)   LMP 2024   BMI 22.56 kg/m²     ASSESSMENT   39 y.o.  female with  secondary infertility x 1 year, suspected ovulation and the following pertinent medical issues: has 3 blast embryos from IVF , successful FET 2019.    Partner SA: Normal    COUNSELING  We discussed causes of infertility including hormonal, egg quality issues, structural problems such as endometriosis, adhesions, or tubal problems, uterine factors such as polyps or fibroids, and sperm issues. Reviewed evaluation of such as well. We discussed various methods for achieving pregnancy in some detail including, ovulation induction, insemination, superovulation and IVF.      Routine Testing  Fertility Center  STDs Within 1 year   Genetic carrier Waiver/Completed   T&S Within 1 year   AMH Within 1 year   TSH Within 1 year   Rubella/Varicella Within 5 years       FET PLAN:  FET NP Teaching Performed  Reviewed plan for upcoming FET.   Reviewed lining check. Reviewed estrogen priming and progesterone.   Reviewed need for ultrasound monitoring. Reviewed cancelled cycles, sometimes multiple to achieve optimal lining.   Reviewed possibility that embryos might not survive thaw and need for signed thaw plan.   All questions answered.     [X ] Hysteroscopy for cavity evaluation- call CD 1 next menses to schedule  (X)We have agree to transfer: 1 embryos    FET Protocol: Estrace 6 mg/vaginal Estrace 2 mg BID PV/LORRI 75 mg    PLAN  T&S  TSH  STDs  Varicella  Pelvic Ultrasound this cycle  Hysteroscopy- call CD 1 next menses to schedule  Update FET consents  Financial consult  Chart to primary nurse for care coordination and patient check " list/education  Enroll in Engaged MD  Start prenatal vitamins, vitamin D 2000 IUs daily  Discussed that pap and mammogram must be updated per ACOG guidelines before treatment can begin- up to date for pap test  Discussed that treatment cannot proceed until checklist items are complete   6 week virtual follow up with me   Additional testing for BMI < 18 or > 40: NA    MD Completion:  Ectopic Risk: No  Medically Complex: No      Partner:  None at this time      FOLLOW UP PLAN:  Programmed FET Protocol: Estrace 6 mg/vaginal Estrace 2 mg BID PV/LORRI 75 mg    Julita Pinto CNP 08/26/24 9:10 AM

## 2024-08-26 NOTE — PROGRESS NOTES
Boarding [place] Interval FET Checklist    Age: 39 y.o.    2021 FT  male 6.12 no complications with pregnancy/delivery (fast, baby in NICU x 1 week)- natural conception  10- FT  IOL and pushed 4 hrs female 8.10 no complications with pregnancy/delivery - FET  Provider: Julita Pinto CNP  Primary RN: Gertrudis Polanco  Reasons for Treatment: secondary infertility x 1 year, s/p IVF in  with successful FET 2019 and then with spontaneous conception  and delivered , would like to discuss FET, has 3 remaining embryos   Last BMI  24 : 22.38 kg/m²       Past Medical History:   Diagnosis Date    Acute upper respiratory infection, unspecified 2021    URI, acute    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    GBS carrier    Contact with and (suspected) exposure to covid-19 2021    Suspected COVID-19 virus infection    Diseases of the digestive system complicating pregnancy, unspecified trimester ([facility]) 2019    Constipation in pregnancy    Encounter for gynecological examination (general) (routine) without abnormal findings 2020    Encounter for well woman exam with routine gynecological exam    Encounter for routine postpartum follow-up ([facility]) 2019    Postpartum care following  delivery    Encounter for routine postpartum follow-up ([facility]) 2021    Routine postpartum follow-up    Hashimoto's thyroiditis     Laceration without foreign body of unspecified finger without damage to nail, initial encounter 2020    Finger laceration    Maternal care for unspecified type scar from previous  delivery ([facility]) 2021    Uterine scar from previous  delivery affecting pregnancy    Other conditions influencing health status 10/15/2019    Large for gestational age fetus    Other hypertrophic disorders of the skin 2021    Skin tag    Personal history of other complications of pregnancy, childbirth and the  puerperium 08/12/2021    History of lactation disorder    Personal history of other diseases of the female genital tract 12/11/2018    History of female infertility    Personal history of other endocrine, nutritional and metabolic disease     History of Hashimoto thyroiditis    Supervision of pregnancy resulting from assisted reproductive technology, unspecified trimester ([facility]) 10/15/2019    Pregnancy resulting from in vitro fertilization       Date Done Consultation Results/Comments   8/26/24 Medication Protocol FOLLOW UP PLAN:  Programmed FET Protocol: Estrace 6 mg/vaginal Estrace 2 mg BID PV/LORRI 75 mg     Julita Pinto CNP 08/26/24 9:10 AM     Cycle #2: Programmed FET  Estrace patches  IM P4 75 mg   Baby ASA to start with FET  -Ensure normal TSH prior to starting medications for FET     Sophia Quintanilla 11/12/24 3:00 PM   9/3/24 FET Treatment Plan Packet- ID REQ (Every cycle) Received and in chart: Yes (Gertrudis Polanco RN)      Cycle #2    9/3/24 IVF Information and Authorization - [employer]/offsite Storage (ID REQ) (Yearly) Received and in chart: Yes (Gertrudis Polanco RN)   9/3/24 [employer] Embryo- Couple or Single Packet (Yearly) Received and in chart: Yes (Gertrudis Polanco RN)   9/3/24  Waiver (In) Form Received and in chart: Yes (Gertrudis Polanco RN)    Cycle #2:    9/3/24 Embryo Ship In 1 Embryo ship in;    ID#   Embryo #1 [facility]      8/26/24 Procedure Order Placed [x]       Cycle #2:    N/A MFM Consult Okay to proceed? N/A   N/A Psych Consult Okay to proceed? N/A   N/A Genetics Consult Okay to proceed? N/A    Other    Date Done Female Labs Results/Comments   8/26/2024 T&S (Q 1 Year) ABO: AB  Rh: POS  Antibody: NEG   8/26/2024 Hep B sAg Nonreactive   8/26/2024 Hep C AB Nonreactive   8/26/2024 HIV Nonreactive   8/26/2024 Syphilis Nonreactive   8/26/2024 GC/CT GC: Negative  CT: Negative   1/6/2021 Rubella (Q 5 Years) POSITIVE   8/26/2024 Varicella (Q 5 Years) Positive (A)   8/26/2024  TSH 2.20 (Ref range: 0.44 - 3.98 mIU/L)    HgbA1C     Uterine Cavity Eval Pelvic 8/27/24: Impression  =========     Multiple dominant follicles noted. Otherwise , Unremarkable pelvic survey.  Follow-up  ========     Follow-up with ordering Provider. Follow up for repeat assessment per clinical team  Pelvic update 9/19/24: Per Dr. Francisco/BEBA Pinto CNP, no repeat pelvic ultrasound is required  BEBA Pinto CNP/QUIRINO Rand RN 9/19/24 @ 1650    HSG:   SIS:   Hyster 9/19/2024: The hysteroscope was placed in the cervix and advanced into the uterine cavity. Normal saline was used for distension media. Images were obtained and findings noted as below.   All instruments were then removed. Good hemostasis was noted. Patient tolerated the procedure well returned to the recovery area in stable condition. .   Findings:   Cavity: Smooth cavity no lesions noted  Ostia: not visualized on exam  Additional Notes:    I reviewed the resident/fellow's documentation and discussed the patient with the resident/fellow. I agree with the resident/fellow's medical decision making as documented in the note.      Juanita Gordon MD         3/14/2024 Pap Smear Lab Results   Component Value Date    FINALINTERP  03/14/2024         A. THINPREP PAP CERVIX, SCREENING -     Specimen Adequacy  Satisfactory for evaluation; absence of endocervical/transformation zone component    General Categorization  Negative for intraepithelial lesion or malignancy.    Descriptive Interpretation  Negative for intraepithelial lesion or malignancy            [id number]  05/23/2022     SKIN, MID BACK, SHAVE BIOPSY:  MILDLY DYSPLASTIC COMPOUND NEVUS, INKED MARGINS FREE IN PLANES OF SECTIONS  EXAMINED.                  Electronically Signed Out by JOHN FARRELL M.D.         HPV: Negative   N/A Mammogram ( > 40) N/A   Date Done Miscellaneous Results/Comments   N/A BMI Checklist  BMI > 40 or < 18 Added to chart:   No   N/A >= 45 Checklist  Added to chart:   No   **Does not  need to be completed prior to placing on IVF calendar**    MD Completion:  Ectopic Risk: No  Medically Complex: No    Sophia Quintanilla 09/24/24 4:12 PM

## 2024-08-27 ENCOUNTER — SPECIALTY PHARMACY (OUTPATIENT)
Dept: PHARMACY | Facility: CLINIC | Age: 39
End: 2024-08-27

## 2024-08-27 ENCOUNTER — ANCILLARY PROCEDURE (OUTPATIENT)
Dept: ENDOCRINOLOGY | Facility: CLINIC | Age: 39
End: 2024-08-27
Payer: COMMERCIAL

## 2024-08-27 DIAGNOSIS — Z31.41 FERTILITY TESTING: ICD-10-CM

## 2024-08-27 LAB
C TRACH RRNA SPEC QL NAA+PROBE: NEGATIVE
N GONORRHOEA DNA SPEC QL PROBE+SIG AMP: NEGATIVE

## 2024-08-27 PROCEDURE — 76830 TRANSVAGINAL US NON-OB: CPT | Performed by: OBSTETRICS & GYNECOLOGY

## 2024-08-27 PROCEDURE — 76830 TRANSVAGINAL US NON-OB: CPT

## 2024-08-27 PROCEDURE — 76856 US EXAM PELVIC COMPLETE: CPT | Performed by: OBSTETRICS & GYNECOLOGY

## 2024-09-12 DIAGNOSIS — E03.9 HYPOTHYROIDISM, UNSPECIFIED TYPE: ICD-10-CM

## 2024-09-12 RX ORDER — LEVOTHYROXINE SODIUM 112 UG/1
112 TABLET ORAL DAILY
Qty: 90 TABLET | Refills: 3 | Status: SHIPPED | OUTPATIENT
Start: 2024-09-12

## 2024-09-18 ENCOUNTER — PREP FOR PROCEDURE (OUTPATIENT)
Dept: ENDOCRINOLOGY | Facility: CLINIC | Age: 39
End: 2024-09-18
Payer: COMMERCIAL

## 2024-09-18 RX ORDER — ACETAMINOPHEN 325 MG/1
650 TABLET ORAL ONCE AS NEEDED
Status: CANCELLED | OUTPATIENT
Start: 2024-09-18

## 2024-09-18 RX ORDER — KETOROLAC TROMETHAMINE 30 MG/ML
30 INJECTION, SOLUTION INTRAMUSCULAR; INTRAVENOUS ONCE AS NEEDED
Status: CANCELLED | OUTPATIENT
Start: 2024-09-18 | End: 2024-09-23

## 2024-09-19 ENCOUNTER — HOSPITAL ENCOUNTER (OUTPATIENT)
Dept: ENDOCRINOLOGY | Facility: CLINIC | Age: 39
Discharge: HOME | End: 2024-09-19
Payer: COMMERCIAL

## 2024-09-19 ENCOUNTER — TELEPHONE (OUTPATIENT)
Dept: ENDOCRINOLOGY | Facility: CLINIC | Age: 39
End: 2024-09-19

## 2024-09-19 VITALS
WEIGHT: 126.32 LBS | SYSTOLIC BLOOD PRESSURE: 103 MMHG | OXYGEN SATURATION: 100 % | RESPIRATION RATE: 20 BRPM | TEMPERATURE: 98.1 F | BODY MASS INDEX: 22.38 KG/M2 | HEIGHT: 63 IN | DIASTOLIC BLOOD PRESSURE: 70 MMHG

## 2024-09-19 DIAGNOSIS — Z31.41 FERTILITY TESTING: ICD-10-CM

## 2024-09-19 LAB — PREGNANCY TEST URINE, POC: NEGATIVE

## 2024-09-19 PROCEDURE — 64435 NJX AA&/STRD PARACRV NRV: CPT | Mod: GC,59 | Performed by: STUDENT IN AN ORGANIZED HEALTH CARE EDUCATION/TRAINING PROGRAM

## 2024-09-19 PROCEDURE — 58555 HYSTEROSCOPY DX SEP PROC: CPT | Performed by: STUDENT IN AN ORGANIZED HEALTH CARE EDUCATION/TRAINING PROGRAM

## 2024-09-19 PROCEDURE — 58555 HYSTEROSCOPY DX SEP PROC: CPT | Mod: GC | Performed by: STUDENT IN AN ORGANIZED HEALTH CARE EDUCATION/TRAINING PROGRAM

## 2024-09-19 PROCEDURE — 7100000009 HC PHASE TWO TIME - INITIAL BASE CHARGE

## 2024-09-19 PROCEDURE — 7100000010 HC PHASE TWO TIME - EACH INCREMENTAL 1 MINUTE

## 2024-09-19 RX ORDER — KETOROLAC TROMETHAMINE 30 MG/ML
30 INJECTION, SOLUTION INTRAMUSCULAR; INTRAVENOUS ONCE AS NEEDED
Status: DISCONTINUED | OUTPATIENT
Start: 2024-09-19 | End: 2024-09-20 | Stop reason: HOSPADM

## 2024-09-19 RX ORDER — ACETAMINOPHEN 325 MG/1
650 TABLET ORAL ONCE AS NEEDED
Status: DISCONTINUED | OUTPATIENT
Start: 2024-09-19 | End: 2024-09-20 | Stop reason: HOSPADM

## 2024-09-19 ASSESSMENT — PAIN SCALES - GENERAL
PAINLEVEL_OUTOF10: 0 - NO PAIN
PAINLEVEL_OUTOF10: 0 - NO PAIN

## 2024-09-19 ASSESSMENT — PAIN - FUNCTIONAL ASSESSMENT
PAIN_FUNCTIONAL_ASSESSMENT: 0-10
PAIN_FUNCTIONAL_ASSESSMENT: 0-10

## 2024-09-19 NOTE — PROGRESS NOTES
Patient ID: Letty Dial is a 39 y.o. female.    Hysteroscopy diagnostic    Date/Time: 9/19/2024 2:18 PM    Performed by: Judie Mclean MD  Authorized by: MATEUS Dickerson    Consent:     Consent obtained:  Verbal and written    Consent given by:  Patient    Risks, benefits, and alternatives were discussed: yes      Risks discussed:  Bleeding, infection and pain  Universal protocol:     Procedure explained and questions answered to patient or proxy's satisfaction: yes      Relevant documents present and verified: yes      Test results available: yes      Imaging studies available: yes      Required blood products, implants, devices, and special equipment available: yes      Immediately prior to procedure, a time out was called: yes      Patient identity confirmed:  Verbally with patient, arm band and hospital-assigned identification number  Pre-procedure details:     Skin preparation:  Povidone-iodine  Procedure specific details:      Procedure: Diagnostic Hysteroscopy   Preop diagnosis: fertility testing  Post op diagnosis: Same   Assistant: Fellow    Anesthesia: None   IV: None   EBL: 3 cc  Specimens: None   Complications: None   Risks benefits and alternatives of the procedure explained to the patient and informed consent was obtained. Urine pregnancy test was performed and was negative. Time out was performed. The patient was placed in the dorsal lithotomy position and a sterile speculum was placed in the vagina. The cervix was sterilized with Betadine x3. Paracervical block with lidocaine 1% was administered.   Tenaculum: No  Dilation: No  The hysteroscope was placed in the cervix and advanced into the uterine cavity. Normal saline was used for distension media. Images were obtained and findings noted as below.   All instruments were then removed. Good hemostasis was noted. Patient tolerated the procedure well returned to the recovery area in stable condition. .   Findings:   Cavity: Smooth cavity  no lesions noted  Ostia: not visualized on exam  Additional Notes:          Post-procedure details:     Procedure completion:  Tolerated well, no immediate complications

## 2024-09-19 NOTE — TELEPHONE ENCOUNTER
Telephone call returned to patient. Patient states being CD 6 and having hysteroscopy today, questioning next steps. This RN informed patient that her boarding pass is essentially complete, and she would plan to call us with the start of her next menses. Patient agreeable and questions timeline. This RN discussed with patient that we would have her start her Estrace with the start of her period when she calls. The lining check is usually done around CD 12-14, with the transfer falling around CD 20-24. Patient agreeable. Patient states she will call office with start of next menses and/or if menses will occur on a weekend, will call our office the following Friday to go over plan if she still has questions. Patient aware that her Estrace was ordered to her local pharmacy, and LORRI + supplies ordered to [facility]. Patient states no further questions.    09/19/24 at 4:19 PM - Sima Rand RN

## 2024-09-19 NOTE — PROGRESS NOTES
I reviewed the resident/fellow's documentation and discussed the patient with the resident/fellow. I agree with the resident/fellow's medical decision making as documented in the note.     Juanita Gordon MD

## 2024-09-19 NOTE — TELEPHONE ENCOUNTER
Caller: Letty  Reason for call: questions regarding next steps  Notes: had a Hysteroscopy today calling to talk to Julita about her Transfer next steps.

## 2024-09-25 PROCEDURE — RXMED WILLOW AMBULATORY MEDICATION CHARGE

## 2024-09-30 ENCOUNTER — PHARMACY VISIT (OUTPATIENT)
Dept: PHARMACY | Facility: CLINIC | Age: 39
End: 2024-09-30
Payer: COMMERCIAL

## 2024-09-30 ENCOUNTER — SPECIALTY PHARMACY (OUTPATIENT)
Dept: PHARMACY | Facility: CLINIC | Age: 39
End: 2024-09-30

## 2024-10-09 ENCOUNTER — TELEPHONE (OUTPATIENT)
Dept: ENDOCRINOLOGY | Facility: CLINIC | Age: 39
End: 2024-10-09
Payer: COMMERCIAL

## 2024-10-09 NOTE — TELEPHONE ENCOUNTER
Reason for call: reporting start of cycle  LMP: 10/9  Treatment type: FET  Note: Pt started spotting, would like to discuss next FET.

## 2024-10-09 NOTE — PROGRESS NOTES
Patient called with menses for FET setup.   LMP: 10/9  Protocol: Programmed oral estrace 6mg plus 2mg estrace PV BID starting with estrace, 75mg LORRI IM  Tentative lining check: 10/22  Tentative progesterone start: 10/25  Tentative transfer date: 10/30 (CD 22 with Dr. Quintanilla)  Number of days of progesterone for transfer: 6  Treatment plan confirmed: 9/3/24  In waiver confirmed: 9/3/24  Embryo # confirmed: 3   Location of Embryo: onsite at   Embryo # to transfer: 1    Boarding pass signed off:  Yes 9/24/24    Will call if there are any changes to plan.    Celina Benz  10/10/2024  3:53 PM     Reviewed and approved by DELFINA COSME on 10/10/24 at 12:44 PM.     Pt aware of plan and no change, will continue as discussed yesterday.    10/10/24 at 1:01 PM - Celina Benz RN

## 2024-10-09 NOTE — TELEPHONE ENCOUNTER
Called patient back, full flow today, discussed FET set up, lining check for 10/22 and FET on 10.30. Patient has medications, will start 6mg oral estrace today as well as 2mg vaginal estrace twice a day.  Will take to meeting tomorrow and let patient know if there are any concerns with plan,s scheduled for the 22nd.    10/09/24 at 4:00 PM - Celina Benz RN

## 2024-10-22 ENCOUNTER — ANCILLARY PROCEDURE (OUTPATIENT)
Dept: ENDOCRINOLOGY | Facility: CLINIC | Age: 39
End: 2024-10-22
Payer: COMMERCIAL

## 2024-10-22 ENCOUNTER — APPOINTMENT (OUTPATIENT)
Dept: ENDOCRINOLOGY | Facility: CLINIC | Age: 39
End: 2024-10-22
Payer: COMMERCIAL

## 2024-10-22 DIAGNOSIS — N97.9 FEMALE INFERTILITY: ICD-10-CM

## 2024-10-22 LAB
ESTRADIOL SERPL-MCNC: 402 PG/ML
PROGEST SERPL-MCNC: 0.3 NG/ML

## 2024-10-22 PROCEDURE — 82670 ASSAY OF TOTAL ESTRADIOL: CPT

## 2024-10-22 PROCEDURE — 84144 ASSAY OF PROGESTERONE: CPT

## 2024-10-22 PROCEDURE — 76857 US EXAM PELVIC LIMITED: CPT | Performed by: OBSTETRICS & GYNECOLOGY

## 2024-10-22 PROCEDURE — 76857 US EXAM PELVIC LIMITED: CPT

## 2024-10-22 NOTE — PROGRESS NOTES
CYCLING NOTE    Here for US and/or lab monitoring; relevant findings reviewed. Patient is 39 years old. Patient of Dr. Quintanilla. Patient is here for FET #4 lining check. Unable to locate past FET stimulation sheets. Protocol- Programmed oral estrace 6mg plus 2mg estrace PV BID starting with estrace, 75mg LORRI IM. Patient to tentatively start LORRI on 10/25/24 with ET on 10/30/24 with Dr. Quintanilla. Scan reviewed this AM with Dr. Hawkins. Discussed with patient that lining is not quite thick enough at this time. Patient instructed she will need to RTC on Thursday 10/24/24 for repeat monitoring. Injection sites not marked at this time.     Patient stayed for nurse visit. Pain is 0/10  Team will contact patient later today with results and plan.    Bob Polanco  10/22/2024  10:40 AM    Spoke with patient regarding plan from meeting as well as sent patient my chart message with plan. Patient instructed to continue oral and vaginal estrace and RTC on Thursday 10/24/24 for repeat monitoring. Patient agreeable and denies further questions/concerns. Patient scheduled on her way out this AM.   BOB POLANCO on 10/22/24 at 2:10 PM.

## 2024-10-24 ENCOUNTER — ANCILLARY PROCEDURE (OUTPATIENT)
Dept: ENDOCRINOLOGY | Facility: CLINIC | Age: 39
End: 2024-10-24
Payer: COMMERCIAL

## 2024-10-24 DIAGNOSIS — N97.9 FEMALE INFERTILITY: ICD-10-CM

## 2024-10-24 LAB
ESTRADIOL SERPL-MCNC: 1493 PG/ML
PROGEST SERPL-MCNC: 0.4 NG/ML

## 2024-10-24 PROCEDURE — 82670 ASSAY OF TOTAL ESTRADIOL: CPT

## 2024-10-24 PROCEDURE — 76857 US EXAM PELVIC LIMITED: CPT

## 2024-10-24 PROCEDURE — 84144 ASSAY OF PROGESTERONE: CPT

## 2024-10-24 PROCEDURE — 76857 US EXAM PELVIC LIMITED: CPT | Performed by: STUDENT IN AN ORGANIZED HEALTH CARE EDUCATION/TRAINING PROGRAM

## 2024-10-24 RX ORDER — ESTRADIOL 2 MG/1
2 TABLET ORAL EVERY MORNING
Start: 2024-10-24

## 2024-10-24 NOTE — PROGRESS NOTES
CYCLING NOTE    Here for US and/or lab monitoring; relevant findings reviewed. 39 year old patient of Dr. Quintanilla is here for a repeat lining check for FET #4. Plan is currently for patent to start LORRI tomorrow for a FET on 10/30 with Dr. Quintanilla.     Protocol- Programmed oral estrace 6mg plus 2mg estrace PV BID starting with estrace, 75mg LORRI IM     Patient stayed for nurse visit. Pain is 0/10 and Progesterone teaching completed with patient , LORRI sites marked.   Team will contact patient later today with results and plan.    DANA RODRIGUEZ  10/24/2024  9:29 AM      Placed call to patient -- left detailed voicemail relaying plan. Reviewed plan leading into transfer and medication plan. Encouraged patient to call back with any additional questions. Patient is aware the lab will call her the day prior to transfer to confirm arrival time.   DANA RODRIGUEZ RN 10/24/2024 13:35

## 2024-10-28 ENCOUNTER — TELEMEDICINE (OUTPATIENT)
Dept: ENDOCRINOLOGY | Facility: CLINIC | Age: 39
End: 2024-10-28
Payer: COMMERCIAL

## 2024-10-28 VITALS — HEIGHT: 63 IN | WEIGHT: 123 LBS | BODY MASS INDEX: 21.79 KG/M2

## 2024-10-28 DIAGNOSIS — N97.9 SECONDARY FEMALE INFERTILITY: Primary | ICD-10-CM

## 2024-10-28 PROCEDURE — 99213 OFFICE O/P EST LOW 20 MIN: CPT

## 2024-10-28 PROCEDURE — 3008F BODY MASS INDEX DOCD: CPT

## 2024-10-28 PROCEDURE — 1036F TOBACCO NON-USER: CPT

## 2024-10-28 ASSESSMENT — PATIENT HEALTH QUESTIONNAIRE - PHQ9
1. LITTLE INTEREST OR PLEASURE IN DOING THINGS: NOT AT ALL
SUM OF ALL RESPONSES TO PHQ9 QUESTIONS 1 AND 2: 0
2. FEELING DOWN, DEPRESSED OR HOPELESS: NOT AT ALL

## 2024-10-28 ASSESSMENT — PAIN SCALES - GENERAL: PAINLEVEL_OUTOF10: 0-NO PAIN

## 2024-10-30 ENCOUNTER — ALLIED HEALTH (OUTPATIENT)
Dept: INTEGRATIVE MEDICINE | Facility: CLINIC | Age: 39
End: 2024-10-30

## 2024-10-30 ENCOUNTER — APPOINTMENT (OUTPATIENT)
Dept: INTEGRATIVE MEDICINE | Facility: CLINIC | Age: 39
End: 2024-10-30

## 2024-10-30 ENCOUNTER — PREP FOR PROCEDURE (OUTPATIENT)
Dept: ENDOCRINOLOGY | Facility: CLINIC | Age: 39
End: 2024-10-30

## 2024-10-30 ENCOUNTER — HOSPITAL ENCOUNTER (OUTPATIENT)
Dept: ENDOCRINOLOGY | Facility: CLINIC | Age: 39
Discharge: HOME | End: 2024-10-30

## 2024-10-30 DIAGNOSIS — N97.9 FEMALE INFERTILITY: ICD-10-CM

## 2024-10-30 DIAGNOSIS — Z32.00 ENCOUNTER FOR PREGNANCY TEST, RESULT UNKNOWN: Primary | ICD-10-CM

## 2024-10-30 DIAGNOSIS — Z78.9 ATTEMPTING TO CONCEIVE: Primary | ICD-10-CM

## 2024-10-30 DIAGNOSIS — E03.9 HYPOTHYROIDISM, UNSPECIFIED TYPE: ICD-10-CM

## 2024-10-30 DIAGNOSIS — Z31.83 ENCOUNTER FOR ASSISTED REPRODUCTIVE FERTILITY CYCLE: ICD-10-CM

## 2024-10-30 LAB — PROGEST SERPL-MCNC: 61.2 NG/ML

## 2024-10-30 PROCEDURE — 89253 EMBRYO HATCHING: CPT | Performed by: OBSTETRICS & GYNECOLOGY

## 2024-10-30 PROCEDURE — 76998 US GUIDE INTRAOP: CPT | Performed by: OBSTETRICS & GYNECOLOGY

## 2024-10-30 PROCEDURE — 89255 PREPARE EMBRYO FOR TRANSFER: CPT | Performed by: OBSTETRICS & GYNECOLOGY

## 2024-10-30 PROCEDURE — 97810 ACUP 1/> WO ESTIM 1ST 15 MIN: CPT | Performed by: ACUPUNCTURIST

## 2024-10-30 PROCEDURE — 84144 ASSAY OF PROGESTERONE: CPT

## 2024-10-30 PROCEDURE — 89352 THAWING CRYOPRESRVED EMBRYO: CPT | Performed by: OBSTETRICS & GYNECOLOGY

## 2024-10-30 PROCEDURE — 58974 EMBRYO TRANSFER INTRAUTERINE: CPT | Performed by: OBSTETRICS & GYNECOLOGY

## 2024-10-30 PROCEDURE — 97811 ACUP 1/> W/O ESTIM EA ADD 15: CPT | Performed by: ACUPUNCTURIST

## 2024-10-30 RX ORDER — ACETAMINOPHEN 325 MG/1
650 TABLET ORAL ONCE AS NEEDED
Status: DISCONTINUED | OUTPATIENT
Start: 2024-10-30 | End: 2024-10-31 | Stop reason: HOSPADM

## 2024-10-30 RX ORDER — ACETAMINOPHEN 325 MG/1
650 TABLET ORAL ONCE AS NEEDED
Status: CANCELLED | OUTPATIENT
Start: 2024-10-30

## 2024-11-11 ENCOUNTER — LAB (OUTPATIENT)
Dept: LAB | Facility: LAB | Age: 39
End: 2024-11-11
Payer: COMMERCIAL

## 2024-11-11 DIAGNOSIS — N97.9 FEMALE INFERTILITY: ICD-10-CM

## 2024-11-11 DIAGNOSIS — E03.9 HYPOTHYROIDISM, UNSPECIFIED TYPE: ICD-10-CM

## 2024-11-11 DIAGNOSIS — Z32.00 ENCOUNTER FOR PREGNANCY TEST, RESULT UNKNOWN: ICD-10-CM

## 2024-11-11 LAB
B-HCG SERPL-ACNC: <3 MIU/ML
TSH SERPL-ACNC: 8.93 MIU/L (ref 0.44–3.98)

## 2024-11-11 PROCEDURE — 84702 CHORIONIC GONADOTROPIN TEST: CPT

## 2024-11-11 PROCEDURE — 84443 ASSAY THYROID STIM HORMONE: CPT

## 2024-11-11 PROCEDURE — 36415 COLL VENOUS BLD VENIPUNCTURE: CPT

## 2024-11-11 RX ORDER — LEVOTHYROXINE SODIUM 150 UG/1
150 TABLET ORAL DAILY
Qty: 30 TABLET | Refills: 11 | Status: SHIPPED | OUTPATIENT
Start: 2024-11-11 | End: 2025-11-11

## 2024-11-11 NOTE — PROGRESS NOTES
Patient presenting to outpatient  lab for TSH and initial Hcg post FET with Dr. Quintanilla on 10/30. Patient has two blasts remaining. Patient does currently take 112mcg of synthroid daily.      Latest Reference Range & Units 11/11/24 08:51   HCG, Beta-Quantitative <5 mIU/mL <3   Thyroid Stimulating Hormone 0.44 - 3.98 mIU/L 8.93 (H)   (H): Data is abnormally high    DANA RODRIGUEZ RN 11/11/2024 11:56    [facility] PROVIDER NOTE  Ultrasound and/or labs reviewed at [facility].   Results for orders placed or performed in visit on 11/11/24 (from the past 96 hours)   Human Chorionic Gonadotropin, Serum Quantitative   Result Value Ref Range    HCG, Beta-Quantitative <3 <5 mIU/mL   TSH   Result Value Ref Range    Thyroid Stimulating Hormone 8.93 (H) 0.44 - 3.98 mIU/L       Increase Synthroid to 150 mcg/day.  OK to stop meds, await menses.  Dr. Quintanilla to be notified by nursing staff.   Kee Hawkins  11/11/2024  1:11 PM    Phone call to patient to discuss results of blood pregnancy test. Left detailed voicemail relaying that Unfortunately HCG today is negative. Offered support to patient. Reviewed to stop medications and expect menses within the next week.  [employer] message sent to patient as well. Updated synthroid order sent to Dr. Hawkins.     DANA RODRIGUEZ 11/11/24 2:58 PM

## 2024-11-12 ENCOUNTER — TELEPHONE (OUTPATIENT)
Dept: ENDOCRINOLOGY | Facility: CLINIC | Age: 39
End: 2024-11-12
Payer: COMMERCIAL

## 2024-11-12 NOTE — TELEPHONE ENCOUNTER
Called patient to review failed FET    This was her first FET after a successful delivery.    Did a programmed FET with oral and vaginal estrace - lining ~ 7 mm TL  TSH also ~ 8 day of negative hCG    Plan:  Take 150 mcg synthroid- repeat in 6 weeks  Will try again with programmed FET in the new year.  Updated protocol:    Programmed FET  Estrace patches  IM P4 75 mg   Baby ASA to start with FET  -Ensure normal TSH prior to starting medications for FET    Sophia Quintanilla 11/12/24 3:00 PM

## 2024-12-03 DIAGNOSIS — J01.90 ACUTE SINUSITIS, RECURRENCE NOT SPECIFIED, UNSPECIFIED LOCATION: Primary | ICD-10-CM

## 2024-12-03 RX ORDER — AZITHROMYCIN 250 MG/1
TABLET, FILM COATED ORAL
Qty: 6 TABLET | Refills: 0 | Status: SHIPPED | OUTPATIENT
Start: 2024-12-03 | End: 2024-12-08

## 2024-12-05 DIAGNOSIS — Z13.29 THYROID DISORDER SCREENING: Primary | ICD-10-CM

## 2024-12-05 NOTE — PROGRESS NOTES
Pended order for TSH repeat to Dr. Quintanilla. Patient on noon huddle for 12/23 review.   DANA RODRIGUEZ RN 12/05/2024 11:15

## 2024-12-23 NOTE — PROGRESS NOTES
Patient is going to repeat TSH after increasing synthroid prescription from 112 mcg to 150 mcg. Will contact patient to discuss results pending provider review of lab result.   DANA RODRIGUEZ RN 12/23/2024 07:41

## 2024-12-24 ENCOUNTER — LAB (OUTPATIENT)
Dept: LAB | Facility: LAB | Age: 39
End: 2024-12-24
Payer: COMMERCIAL

## 2024-12-24 DIAGNOSIS — Z13.29 THYROID DISORDER SCREENING: ICD-10-CM

## 2024-12-24 LAB
T4 FREE SERPL-MCNC: 2.01 NG/DL (ref 0.78–1.48)
TSH SERPL-ACNC: 0.04 MIU/L (ref 0.44–3.98)

## 2024-12-24 PROCEDURE — 84443 ASSAY THYROID STIM HORMONE: CPT

## 2024-12-24 PROCEDURE — 84439 ASSAY OF FREE THYROXINE: CPT

## 2024-12-26 DIAGNOSIS — E03.9 HYPOTHYROIDISM, UNSPECIFIED TYPE: ICD-10-CM

## 2024-12-26 RX ORDER — LEVOTHYROXINE SODIUM 125 UG/1
125 TABLET ORAL DAILY
Qty: 90 TABLET | Refills: 3 | Status: SHIPPED | OUTPATIENT
Start: 2024-12-26

## 2024-12-26 NOTE — PROGRESS NOTES
Patient returning to outpatient  lab to repeat TSH after increasing synthroid prescription from 112 mcg to 150 mcg. Will contact patient to discuss results pending provider review of lab result. Previous TSH was 8.93.     Latest Reference Range & Units 12/24/24 11:12   Thyroxine, Free 0.78 - 1.48 ng/dL 2.01 (H)   Thyroid Stimulating Hormone 0.44 - 3.98 mIU/L 0.04 (L)   (H): Data is abnormally high  (L): Data is abnormally low    DANA RODRIGUEZ RN 12/26/2024 11:05    Reduce Synthroid to 125 mcg daily and recheck in 4-6 weeks  Sophia Quintanilla 12/26/24 12:52 PM    [employer] message sent to patient relaying plan as documented above.   DANA RODRIGUEZ RN 12/26/2024 13:52

## 2025-01-21 NOTE — PROGRESS NOTES
Patient presenting to outside  lab for TSH lab draw. Patient decreased Synthroid to 125 mcg daily on 12/26/24. Will contact patient with result and plan after level is reviewed by a provider. TSH on 12/24/24 was 0.04.   BOB SALAZAR on 1/21/25 at 8:46 AM.     Patient has not yet gone for blood work. My chart message sent to patient and patient added to noon huddle for review tomorrow 1/22/25.   BOB SALAZAR on 1/21/25 at 12:26 PM.

## 2025-01-22 NOTE — PROGRESS NOTES
Patient presenting to outside  lab for TSH lab draw. Patient decreased Synthroid to 125 mcg daily on 12/26/24. Will contact patient with result and plan after level is reviewed by a provider. TSH on 12/24/24 was 0.04.   BOB SALAZAR on 1/22/25 at 8:25 AM.    Patient has not yet gone for blood work. Patient added to noon huddle for tomorrow 1/23/24. My chart message sent to patient to follow up.   BOB SALAZAR on 1/22/25 at 1:48 PM.

## 2025-01-23 NOTE — PROGRESS NOTES
Patient presenting to outside  lab for TSH lab draw. Patient decreased Synthroid to 125 mcg daily on 12/26/24. Will contact patient with result and plan after level is reviewed by a provider. TSH on 12/24/24 was 0.04.     DANA RODRIGUEZ RN 01/23/2025 11:17    Patient still has not gone to lab -- will send myChart reminder to patient and add to noon huddle tomorrow for further review.   DANA RODRIGUEZ RN 01/23/2025 16:27

## 2025-01-24 ENCOUNTER — LAB (OUTPATIENT)
Dept: LAB | Facility: LAB | Age: 40
End: 2025-01-24
Payer: COMMERCIAL

## 2025-01-24 DIAGNOSIS — E03.9 HYPOTHYROIDISM, UNSPECIFIED TYPE: ICD-10-CM

## 2025-01-24 LAB
T4 FREE SERPL-MCNC: 1.85 NG/DL (ref 0.78–1.48)
TSH SERPL-ACNC: 0.05 MIU/L (ref 0.44–3.98)

## 2025-01-24 PROCEDURE — 84439 ASSAY OF FREE THYROXINE: CPT

## 2025-01-24 PROCEDURE — 84443 ASSAY THYROID STIM HORMONE: CPT

## 2025-01-24 NOTE — PROGRESS NOTES
Patient presenting to outside  lab for TSH lab draw. Patient decreased Synthroid to 125 mcg daily on 12/26/24. Will contact patient with result and plan after level is reviewed by a provider. TSH on 12/24/24 was 0.04.   BOB SALAZAR on 1/24/25 at 8:12 AM.     Lab still in process. Message sent to patient. Patient added to Monday huddle for review.   BOB SALAZAR on 1/24/25 at 4:27 PM.

## 2025-01-28 ENCOUNTER — TELEPHONE (OUTPATIENT)
Dept: ENDOCRINOLOGY | Facility: CLINIC | Age: 40
End: 2025-01-28

## 2025-01-28 DIAGNOSIS — N97.9 FEMALE INFERTILITY: ICD-10-CM

## 2025-01-28 DIAGNOSIS — E03.9 HYPOTHYROIDISM, UNSPECIFIED TYPE: Primary | ICD-10-CM

## 2025-01-28 DIAGNOSIS — Z31.83 ENCOUNTER FOR ASSISTED REPRODUCTIVE FERTILITY CYCLE: ICD-10-CM

## 2025-01-28 RX ORDER — LEVOTHYROXINE SODIUM 100 UG/1
100 TABLET ORAL DAILY
Qty: 30 TABLET | Refills: 1 | Status: SHIPPED | OUTPATIENT
Start: 2025-01-28 | End: 2026-01-28

## 2025-01-28 RX ORDER — ESTRADIOL 0.1 MG/D
3 FILM, EXTENDED RELEASE TRANSDERMAL
Qty: 48 PATCH | Refills: 2 | Status: SHIPPED | OUTPATIENT
Start: 2025-01-28 | End: 2026-01-28

## 2025-01-28 NOTE — TELEPHONE ENCOUNTER
Returned patient call regarding next steps for FET. Patient instructed that this RN will notify Dr. Quintanilla of current TSH and plan to decrease dose of Levothyroxine to 100 mcg and repeat in 4-6 weeks. This RN to notify Dr. Quintanilla that patient would like to start transfer cycle with upcoming menses she expects in a week. Patient instructed Dr. Quintanilla noted to ensure normal TSH prior to another cycle but this RN will confirm. Patient instructed to complete new FET treatment packet via [employer]. This RN pended orders for estrace patches and transfer order. Patient confirmed she has LORRI and supplies. Patient instructed this RN will update patient on recommendation from Dr. Quintanilla. Patient agreeable and denies further questions/concerns.   BOB SALAZAR on 1/28/25 at 3:20 PM.

## 2025-01-28 NOTE — PROGRESS NOTES
Patient presenting to outside  lab for TSH lab draw. Patient decreased Synthroid to 125 mcg daily on 12/26/24. Will contact patient with result and plan after level is reviewed by a provider. TSH on 12/24/24 was 0.04.   BOB SALAZAR on 1/28/25 at 7:44 AM.     Monitoring Patient: TSH Level on 1- low at 0.05/T4 1.85- will decrease current dose of Levothyroxine to 100 mcg daily from 125 mcg daily and repeat TSH/T4 in 4-6 wks. Plan agreed upon by Dr. Goode. Plan  to be communicated by RN. Julita Pinto CNP 01/28/25 1:01 PM     My chart message sent to patient with above plan.   BOB SALAZAR on 1/28/25 at 2:29 PM.

## 2025-01-28 NOTE — TELEPHONE ENCOUNTER
----- Message from Sophia Quintanilla sent at 1/28/2025  3:32 PM EST -----  A slightly low Tsh is okay - she can get started  ----- Message -----  From: Bob Polanco RN  Sent: 1/28/2025   3:15 PM EST  To: Sophia Quintanilla MD    Hi Dr. Quintanilla,     I spoke with Letty today regarding her TSH. We have been adjusting her levothyroxine dose following a failed FET. Today her TSH was still low at 0.5. Julita lowered her dose again from 125 to 100 and would like her to repeat again in 4-6 weeks. The patient is very eager to get started with another transfer and expects menses in the next week. She would like to know if she can start another transfer cycle then or you would like her to wait another month. I explained to her that you did note that you would like it to be normal prior to a transfer but she wanted me to check and see if you had a certain goal for her TSH level.   Thank you,   BOB POLANCO on 1/28/25 at 3:15 PM.

## 2025-06-16 ENCOUNTER — TELEPHONE (OUTPATIENT)
Dept: OBSTETRICS AND GYNECOLOGY | Facility: CLINIC | Age: 40
End: 2025-06-16
Payer: COMMERCIAL

## 2025-06-16 NOTE — TELEPHONE ENCOUNTER
Attempted to call pt back to further discuss he rrmed refill request.  Is ramiro the prescribing doctor?  Upon further chart review, it was previously given by [facility]

## 2025-06-17 DIAGNOSIS — E03.9 HYPOTHYROIDISM, UNSPECIFIED TYPE: ICD-10-CM

## 2025-06-17 RX ORDER — LEVOTHYROXINE SODIUM 100 UG/1
100 TABLET ORAL DAILY
Qty: 30 TABLET | Refills: 1 | Status: SHIPPED | OUTPATIENT
Start: 2025-06-17 | End: 2026-06-17

## 2025-06-17 NOTE — TELEPHONE ENCOUNTER
Pt returned call call to office.   PCP left .    Took a break from [facility].   Will run out of her synthroid in 2 days.  Will dr rubio refill it until she establishes with a new PCP?  Nurse will confirm with provider

## 2025-06-17 NOTE — TELEPHONE ENCOUNTER
Attempted to reach pt again to discuss her med refill request.  Got her voice mail.  Message left to call office.

## 2025-07-09 DIAGNOSIS — E03.9 HYPOTHYROIDISM, UNSPECIFIED TYPE: ICD-10-CM

## 2025-07-09 RX ORDER — LEVOTHYROXINE SODIUM 100 UG/1
TABLET ORAL
Qty: 90 TABLET | Refills: 1 | OUTPATIENT
Start: 2025-07-09

## 2025-07-16 ENCOUNTER — TELEPHONE (OUTPATIENT)
Dept: OBSTETRICS AND GYNECOLOGY | Facility: CLINIC | Age: 40
End: 2025-07-16
Payer: COMMERCIAL

## 2025-07-16 NOTE — TELEPHONE ENCOUNTER
Pt returned call to office.    LMP 7/14/25.  Reports monthly cycles.  Put infertility work up on hold.  States heavy bleeding with clots this morning upon awakening.  Denies SOB, fatigue, dizziness.  Soaked thru a super plus tampon in an hour.  Pt advised to monitor bleeding with pad use only today.  To call back if soaks through a pad every hour for 2 hours straight.  Encouraged 600mg ibuprofen use q6h today.  Pt will also perform UPT to r/o pregnancy.

## 2025-07-16 NOTE — TELEPHONE ENCOUNTER
Attempted to call pt back to further discuss her telephone message.   Got her voice mail.  Message left to call office.

## 2025-08-06 ENCOUNTER — TELEPHONE (OUTPATIENT)
Dept: OBSTETRICS AND GYNECOLOGY | Facility: HOSPITAL | Age: 40
End: 2025-08-06
Payer: COMMERCIAL

## 2025-08-07 NOTE — TELEPHONE ENCOUNTER
Returned patient's call through the answering service.  Pt has had saline implants for 12-13 years and believes one of the implants has ruptured, as she has had a large decrease in the volume/size of the breast.  We reviewed that there is no urgent/emergent issue with a rupture of the saline implant and that the implant can be replaced at a time that is convenient for the patient.  Pt planning to contact her primary breast surgeon who placed the implant tomorrow. If she cannot get in quickly, will contact our office for a possible breast surgeon referral tomorrow.

## 2025-08-21 ENCOUNTER — APPOINTMENT (OUTPATIENT)
Dept: PRIMARY CARE | Facility: CLINIC | Age: 40
End: 2025-08-21
Payer: COMMERCIAL

## 2025-11-20 ENCOUNTER — APPOINTMENT (OUTPATIENT)
Dept: PRIMARY CARE | Facility: CLINIC | Age: 40
End: 2025-11-20
Payer: COMMERCIAL